# Patient Record
Sex: MALE | Race: WHITE | Employment: STUDENT | URBAN - METROPOLITAN AREA
[De-identification: names, ages, dates, MRNs, and addresses within clinical notes are randomized per-mention and may not be internally consistent; named-entity substitution may affect disease eponyms.]

---

## 2017-05-23 ENCOUNTER — HOSPITAL ENCOUNTER (OUTPATIENT)
Dept: PHYSICAL THERAPY | Age: 27
Discharge: HOME | End: 2017-05-23
Payer: OTHER GOVERNMENT

## 2017-05-23 PROCEDURE — 97162 PT EVAL MOD COMPLEX 30 MIN: CPT

## 2017-05-23 PROCEDURE — 97016 VASOPNEUMATIC DEVICE THERAPY: CPT

## 2017-05-23 PROCEDURE — 97140 MANUAL THERAPY 1/> REGIONS: CPT

## 2017-05-23 PROCEDURE — 97110 THERAPEUTIC EXERCISES: CPT

## 2017-05-23 NOTE — PROGRESS NOTES
PT DAILY TREATMENT NOTE     Patient Name: Willis Ramos  Date:2017  : 1990  [x]  Patient  Verified  Payor:  / Plan: [facility]  ACTIVE DUTY AND DEPENDENTS / Product Type: [employer] /    In time:3:05  Out time:4:00  Total Treatment Time (min): 55  Visit #: 1 of     Treatment Area: Right knee pain [M25.561]    SUBJECTIVE  Pain Level (0-10 scale): 1/10  Any medication changes, allergies to medications, adverse drug reactions, diagnosis change, or new procedure performed?: [x] No    [] Yes (see summary sheet for update)  Subjective functional status/changes:   [] No changes reported  The patient has a chief complaint of R knee stiffness/pain    OBJECTIVE  Modality rationale: decrease edema, decrease inflammation and decrease pain to improve the patients ability to improve ADL ease. Min Type Additional Details   10 [x]  Vasopneumatic Device Pressure:       [x] lo [] med [] hi   Temperature: [x] lo [] med [] hi   [] Skin assessment post-treatment:  []intact []redness- no adverse reaction    []redness  adverse reaction:     27 min X Eval                  -Re-Eval       10 min Therapeutic Exercise:  [x] See flow sheet :   Rationale: increase ROM and increase strength to improve the patients ability to improve ADL ease. 8 min Manual Therapy:  R knee PROM flexion/extension   Rationale: decrease pain, increase ROM and increase tissue extensibility to improve ADL ease. With   [] TE   [] TA   [] neuro   [] other: Patient Education: [x] Review HEP    [] Progressed/Changed HEP based on:   [] positioning   [] body mechanics   [] transfers   [] heat/ice application    [] other:      Other Objective/Functional Measures: See IE     Pain Level (0-10 scale) post treatment: 1/10    ASSESSMENT/Changes in Function: See POC.      Patient will continue to benefit from skilled PT services to modify and progress therapeutic interventions, address functional mobility deficits, address ROM deficits, address strength deficits, analyze and address soft tissue restrictions, analyze and cue movement patterns, analyze and modify body mechanics/ergonomics, assess and modify postural abnormalities and instruct in home and community integration to attain remaining goals. [x]  See Plan of Care  []  See progress note/recertification  []  See Discharge Summary         Progress towards goals / Updated goals:  Short Term Goals: To be accomplished in 2 weeks:  1. The patient will be independent and compliant with HEP to maximize therapeutic benefit. 2. The patient will attain 0 degrees knee extension to maximize ease of ambulation  Long Term Goals: To be accomplished in 4 weeks:  1. The patient will improve FOTO score to 66 to maximize quality of life. 2. The patient will improve knee flexion to 90 degrees per MD protocol for improved ease of transfers. 3. The patient will attain SLR without quad lag for improved stability during stance.   4. The patient will improve HS strength to 4+/5 MMT to maximize transfer ease.     PLAN  []  Upgrade activities as tolerated     [x]  Continue plan of care  []  Update interventions per flow sheet       []  Discharge due to:_  []  Other:_      Shanthi Rodriguez PT 5/23/2017  5:38 PM    Future Appointments  Date Time Provider Dipesh Freeman   5/25/2017 6:00 PM [address] [facility]   5/30/2017 3:30 PM [address] [facility]   6/2/2017 3:30 PM [address] [facility]   6/6/2017 5:30 PM [address] [facility]   6/8/2017 6:00 PM Darwin Flores [facility]   6/13/2017 4:00 PM JERRY WallacePT [facility]   6/16/2017 10:30 AM Shanthi Rodriguez PT [facility]

## 2017-05-23 NOTE — PROGRESS NOTES
[facility]  [address]  [place], [address]  [phone] fax    Plan of Care/ Statement of Necessity for Physical Therapy Services    Patient name: Lilly Landon Start of Care: 2017   Referral source: Ulises Regalado MD : 1990    Medical Diagnosis: Right knee pain [M25.561]   Onset Date:2017    Treatment Diagnosis: R ACL reconstruction/meniscus reconstruction   Prior Hospitalization: see medical history Provider#: [id number]   Medications: Verified on Patient summary List    Comorbidities: R ACL reconstruction/meniscus reconstruction   Prior Level of Function: Inability to run due to pain and popping. The Plan of Care and following information is based on the information from the initial evaluation. Assessment/ key information: The patient is a 32year old male s/p R ACL reconstruction with medial meniscus repair performed on 2017. He states that he was issued crutches and an IROM brace and has been taking it easy for a few days but has noticed a chief complaint of knee stiffness. The patient states that he injured it playing [employer] when he attempted to rotate on a fixed R LE and heard popping. Over the next year he attempted to return to running, but was unable due to popping and pain following this procedure. The patient has impairments consisting of pain, decreased ROM, decreased strength, decreased flexibility, decreased ambulation efficiency. He will benefit from skilled PT in order to address the above impairments.      Evaluation Complexity History MEDIUM  Complexity : 1-2 comorbidities / personal factors will impact the outcome/ POC ; Examination MEDIUM Complexity : 3 Standardized tests and measures addressing body structure, function, activity limitation and / or participation in recreation  ;Presentation LOW Complexity : Stable, uncomplicated  ;Clinical Decision Making MEDIUM Complexity : FOTO score of 26-74  Overall Complexity Rating: MEDIUM  Problem List: pain affecting function, decrease ROM, decrease strength, edema affecting function, impaired gait/ balance, decrease ADL/ functional abilitiies, decrease activity tolerance, decrease flexibility/ joint mobility and decrease transfer abilities   Treatment Plan may include any combination of the following: Therapeutic exercise, Therapeutic activities, Neuromuscular re-education, Physical agent/modality, Gait/balance training, Manual therapy, Patient education, Self Care training, Functional mobility training and Stair training  Patient / Family readiness to learn indicated by: asking questions, trying to perform skills and interest  Persons(s) to be included in education: patient (P)  Barriers to Learning/Limitations: None  Patient Goal (s): full recovery, full ROM  Patient Self Reported Health Status: good  Rehabilitation Potential: good    Short Term Goals: To be accomplished in 2 weeks:   1. The patient will be independent and compliant with HEP to maximize therapeutic benefit. 2. The patient will attain 0 degrees knee extension to maximize ease of ambulation  Long Term Goals: To be accomplished in 4 weeks:   1. The patient will improve FOTO score to 66 to maximize quality of life. 2. The patient will improve knee flexion to 90 degrees per MD protocol for improved ease of transfers. 3. The patient will attain SLR without quad lag for improved stability during stance. 4. The patient will improve HS strength to 4+/5 MMT to maximize transfer ease. Frequency / Duration: Patient to be seen 2-3 times per week for 4 weeks.     Patient/ Caregiver education and instruction: Diagnosis, prognosis, self care, activity modification, brace/ splint application and exercises   [x]  Plan of care has been reviewed with TAMIA Nolan, PT 5/23/2017 5:28 PM    ________________________________________________________________________    I certify that the above Therapy Services are being furnished while the patient is under my care. I agree with the treatment plan and certify that this therapy is necessary.     [de-identified] Signature:____________________  Date:____________Time: _________    Please sign and return to [facility] [address] [place] [address], [facility] [address]  [phone] fax

## 2017-05-25 ENCOUNTER — HOSPITAL ENCOUNTER (OUTPATIENT)
Dept: PHYSICAL THERAPY | Age: 27
Discharge: HOME | End: 2017-05-25
Payer: OTHER GOVERNMENT

## 2017-05-25 PROCEDURE — 97110 THERAPEUTIC EXERCISES: CPT

## 2017-05-25 PROCEDURE — 97112 NEUROMUSCULAR REEDUCATION: CPT

## 2017-05-25 PROCEDURE — 97016 VASOPNEUMATIC DEVICE THERAPY: CPT

## 2017-05-25 NOTE — PROGRESS NOTES
PT DAILY TREATMENT NOTE     Patient Name: Venkatesh Hughes  Date:2017  : 1990  [x]  Patient  Verified  Payor: [facility] / Plan: [employer] [facility] [address] AND DEPENDENTS / Product Type: [employer] /    In time:555  Out time:640  Total Treatment Time (min): 45  Visit #: 2 of     Treatment Area: Right knee pain [M25.561]    SUBJECTIVE  Pain Level (0-10 scale): 0  Any medication changes, allergies to medications, adverse drug reactions, diagnosis change, or new procedure performed?: [x] No    [] Yes (see summary sheet for update)  Subjective functional status/changes:   [] No changes reported  I'm feeling pretty good    OBJECTIVE  27 min Therapeutic Exercise:  [x] See flow sheet :   Rationale: increase ROM and increase strength to improve the patients ability to increase ease of ADLs  8 min Neuromuscular Re-education:  [x]  See flow sheet :   Rationale: increase strength and increase proprioception  to improve the patients ability to increase quad contraction   Modality rationale: decrease edema, decrease inflammation and decrease pain to improve the patients ability to improve quad contraction   Min Type Additional Details    [] Estim:  []Unatt       []IFC  []Premod                        []Other:  []w/ice   []w/heat  Position:  Location:    [] Estim: []Att    []TENS instruct  []NMES                    []Other:  []w/US   []w/ice   []w/heat  Position:  Location:    []  Traction: [] Cervical       []Lumbar                       [] Prone          []Supine                       []Intermittent   []Continuous Lbs:  [] before manual  [] after manual    []  Ultrasound: []Continuous   [] Pulsed                           []1MHz   []3MHz Location:  W/cm2:    []  Iontophoresis with dexamethasone         Location: [] Take home patch   [] In clinic    []  Ice     []  heat  []  Ice massage  []  Laser   []  Anodyne Position:  Location:    []  Laser with stim  []  Other: Position:  Location:   10 [x]  Vasopneumatic Device Pressure:       [x] lo [] med [] hi   Temperature: [x] lo [] med [] hi   [x] Skin assessment post-treatment:  []intact []redness- no adverse reaction    []redness  adverse reaction:             With   [] TE   [] TA   [] neuro   [] other: Patient Education: [x] Review HEP    [] Progressed/Changed HEP based on:   [] positioning   [] body mechanics   [] transfers   [] heat/ice application    [] other:      Other Objective/Functional Measures: ROM: 3-65     Pain Level (0-10 scale) post treatment: 0    ASSESSMENT/Changes in Function: Pain tolerance is excellent. Pt can ambulate safely household distances with 1 AC and locked brace. Extension lag noted in locked brace with SLRs    Patient will continue to benefit from skilled PT services to modify and progress therapeutic interventions, address functional mobility deficits, address ROM deficits, address strength deficits, analyze and address soft tissue restrictions, analyze and cue movement patterns, analyze and modify body mechanics/ergonomics and assess and modify postural abnormalities to attain remaining goals. []  See Plan of Care  []  See progress note/recertification  []  See Discharge Summary         Progress towards goals / Updated goals:  Short Term Goals: To be accomplished in 2 weeks:  1. The patient will be independent and compliant with HEP to maximize therapeutic benefit. Met - compliant with HEP 5/25/2017  2. The patient will attain 0 degrees knee extension to maximize ease of ambulation  Long Term Goals: To be accomplished in 4 weeks:  1. The patient will improve FOTO score to 66 to maximize quality of life. 2. The patient will improve knee flexion to 90 degrees per MD protocol for improved ease of transfers. 3. The patient will attain SLR without quad lag for improved stability during stance. 4. The patient will improve HS strength to 4+/5 MMT to maximize transfer ease.     PLAN  []  Upgrade activities as tolerated     [x]  Continue plan of care  []  Update interventions per flow sheet       []  Discharge due to:_  []  Other:_      Israel Grajeda, PT 5/25/2017  7:15 PM    Future Appointments  Date Time Provider Dipesh Freeman   5/30/2017 3:30 PM [address] [facility]   6/2/2017 3:30 PM [address] [facility]   6/6/2017 5:30 PM [address] [facility]   6/8/2017 6:00 PM Marck Bond [facility]   6/13/2017 4:00 PM [facility]   6/16/2017 10:30 AM Dottie Suero PT [facility]

## 2017-05-30 ENCOUNTER — HOSPITAL ENCOUNTER (OUTPATIENT)
Dept: PHYSICAL THERAPY | Age: 27
Discharge: HOME | End: 2017-05-30
Payer: OTHER GOVERNMENT

## 2017-05-30 PROCEDURE — 97140 MANUAL THERAPY 1/> REGIONS: CPT

## 2017-05-30 PROCEDURE — 97014 ELECTRIC STIMULATION THERAPY: CPT

## 2017-05-30 PROCEDURE — 97016 VASOPNEUMATIC DEVICE THERAPY: CPT

## 2017-05-30 NOTE — PROGRESS NOTES
PT DAILY TREATMENT NOTE     Patient Name: Rose Arroyo  Date:2017  : 1990  [x]  Patient  Verified  Payor:  / Plan: [facility]  ACTIVE DUTY AND DEPENDENTS / Product Type:  /    In time:3:30pm  Out time:4:30pm  Total Treatment Time (min): 60  1:1 time: 12  Visit #: 3 of     Treatment Area: Right knee pain [M25.561]    SUBJECTIVE  Pain Level (0-10 scale): 1  Any medication changes, allergies to medications, adverse drug reactions, diagnosis change, or new procedure performed?: [x] No    [] Yes (see summary sheet for update)  Subjective functional status/changes:   [x] No changes reported    OBJECTIVE    24 min Therapeutic Exercise:  [x] See flow sheet :   Rationale: increase ROM and increase strength to improve the patients ability to improve ADL ease and reduce risk of contracture in preparation for return to normal ambulation. 8 min Neuromuscular Re-education:  [x]  See flow sheet :   Rationale: increase strength and improve coordination  to improve the patients ability to improve quad contraction to improve knee stability during daily tasks.         8 min Manual Therapy:  R knee PROM limiting flexion to 90, grade 1 patellar mobilizations   Rationale: decrease pain, increase ROM and increase tissue extensibility to improve ease of knee mobility in preparation for ambulation    Modality rationale: decrease edema, decrease inflammation and decrease pain to improve the patients ability to improve quad contraction   Min Type Additional Details   10 [x] Estim:  [x]Unatt       []IFC  []Premod                        [x]Other: [place] 10/10 cycle []w/ice   []w/heat  Position: long sit  Location: R quadriceps femoris    [] Estim: []Att    []TENS instruct  []NMES                    []Other:  []w/US   []w/ice   []w/heat  Position:  Location:    []  Traction: [] Cervical       []Lumbar                       [] Prone          []Supine                       []Intermittent   []Continuous Lbs:  [] before manual  [] after manual    []  Ultrasound: []Continuous   [] Pulsed                           []1MHz   []3MHz Location:  W/cm2:    []  Iontophoresis with dexamethasone         Location: [] Take home patch   [] In clinic    []  Ice     []  heat  []  Ice massage  []  Laser   []  Anodyne Position:  Location:    []  Laser with stim  []  Other: Position:  Location:   10 [x]  Vasopneumatic Device Pressure:       [x] lo [] med [] hi   Temperature: [x] lo [] med [] hi   [] Skin assessment post-treatment:  []intact []redness- no adverse reaction    []redness  adverse reaction:        With   [] TE   [] TA   [] neuro   [] other: Patient Education: [x] Review HEP    [] Progressed/Changed HEP based on:   [] positioning   [] body mechanics   [] transfers   [] heat/ice application    [] other:      Other Objective/Functional Measures:     Knee AROM: progressing towards 90 degrees without significant limitations, mild end range TKE deficits grossly assessed     Pain Level (0-10 scale) post treatment: 1    ASSESSMENT/Changes in Function: Pt is progressing well demonstrating fair quad set, but still limited compared to contralateral side. Knee PROM improving well demonstrating near 0-90 with minimal end range flex and ext limitations in that range. Continue per POC. Patient will continue to benefit from skilled PT services to modify and progress therapeutic interventions, address functional mobility deficits, address ROM deficits, address strength deficits, analyze and address soft tissue restrictions, analyze and cue movement patterns, analyze and modify body mechanics/ergonomics and assess and modify postural abnormalities to attain remaining goals. []  See Plan of Care  []  See progress note/recertification  []  See Discharge Summary         Progress towards goals / Updated goals:  Short Term Goals: To be accomplished in 2 weeks:  1.  The patient will be independent and compliant with HEP to maximize therapeutic benefit. Met - compliant with HEP 5/25/2017  2. The patient will attain 0 degrees knee extension to maximize ease of ambulation  Long Term Goals: To be accomplished in 4 weeks:  1. The patient will improve FOTO score to 66 to maximize quality of life. 2. The patient will improve knee flexion to 90 degrees per MD protocol for improved ease of transfers. 3. The patient will attain SLR without quad lag for improved stability during stance. 4. The patient will improve HS strength to 4+/5 MMT to maximize transfer ease.     PLAN  [x]  Upgrade activities as tolerated     [x]  Continue plan of care  []  Update interventions per flow sheet       []  Discharge due to:_  []  Other:_      Grecia Jurado, PT, DPT, ATC, CSCS 5/30/2017  3:37 PM

## 2017-06-02 ENCOUNTER — HOSPITAL ENCOUNTER (OUTPATIENT)
Dept: PHYSICAL THERAPY | Age: 27
Discharge: HOME | End: 2017-06-02
Payer: OTHER GOVERNMENT

## 2017-06-02 PROCEDURE — 97016 VASOPNEUMATIC DEVICE THERAPY: CPT

## 2017-06-02 PROCEDURE — 97140 MANUAL THERAPY 1/> REGIONS: CPT

## 2017-06-02 PROCEDURE — 97112 NEUROMUSCULAR REEDUCATION: CPT

## 2017-06-02 PROCEDURE — 97014 ELECTRIC STIMULATION THERAPY: CPT

## 2017-06-02 NOTE — PROGRESS NOTES
PT DAILY TREATMENT NOTE     Patient Name: Herman Mattson  Date:2017  : 1990  [x]  Patient  Verified  Payor:  / Plan: [facility] DEPENDENTS / Product Type:  /    In time:3:30pm  Out time:4:30pm  Total Treatment Time (min): 60  1:1 time: 20  Visit #: 4 of     Treatment Area: Right knee pain [M25.561]    SUBJECTIVE  Pain Level (0-10 scale): 2  Any medication changes, allergies to medications, adverse drug reactions, diagnosis change, or new procedure performed?: [x] No    [] Yes (see summary sheet for update)  Subjective functional status/changes:   [] No changes reported  Pt reports increased soreness after extensive ambulation with crutches today. Otherwise no complaints. OBJECTIVE    24 min Therapeutic Exercise:  [x] See flow sheet :   Rationale: increase ROM and increase strength to improve the patients ability to improve ADL ease and reduce risk of contracture in preparation for return to normal ambulation. 8 min Neuromuscular Re-education:  [x]  See flow sheet :   Rationale: increase strength and improve coordination  to improve the patients ability to improve quad contraction to improve knee stability. 8 min Manual Therapy:  Grade 1-2 patellar mobilizations all planes, R knee PROM 0-90 degrees   Rationale: decrease pain, increase ROM and increase tissue extensibility to improve ease of ADLs.     Modality rationale: decrease edema, decrease inflammation, decrease pain and increase muscle contraction/control to improve the patients ability to improve quad motor control and reduce inhibition secondary to edema to improve stability with ambulation   Min Type Additional Details   10 [x] Estim:  [x]Unatt       []IFC  []Premod                        [x]Other: [place] 10/10 cycle []w/ice   []w/heat  Position: long sit  Location: R quadriceps femoris    [] Estim: []Att    []TENS instruct  []NMES                    []Other:  []w/US   []w/ice []w/heat  Position:  Location:    []  Traction: [] Cervical       []Lumbar                       [] Prone          []Supine                       []Intermittent   []Continuous Lbs:  [] before manual  [] after manual    []  Ultrasound: []Continuous   [] Pulsed                           []1MHz   []3MHz Location:  W/cm2:    []  Iontophoresis with dexamethasone         Location: [] Take home patch   [] In clinic    []  Ice     []  heat  []  Ice massage  []  Laser   []  Anodyne Position:  Location:    []  Laser with stim  []  Other: Position:  Location:   10 [x]  Vasopneumatic Device Pressure:       [] lo [x] med [] hi   Temperature: [x] lo [] med [] hi   [] Skin assessment post-treatment:  []intact []redness- no adverse reaction    []redness  adverse reaction:           With   [] TE   [] TA   [] neuro   [] other: Patient Education: [x] Review HEP    [] Progressed/Changed HEP based on:   [] positioning   [] body mechanics   [] transfers   [] heat/ice application    [] other:      Other Objective/Functional Measures: mild TKE deficit, grossly 90 degrees knee flexion     Pain Level (0-10 scale) post treatment: 1    ASSESSMENT/Changes in Function: Pt progressing well with knee ROM within protocol precautions with minimal TKE deficits, likely inpart d/t residual edema. Demonstrates improving quad motor control with ability to perform SLR without quad lag, but quickly fatigue with repetitions with quad lag noted after ~6 reps. Continue per POC. Patient will continue to benefit from skilled PT services to modify and progress therapeutic interventions, address functional mobility deficits, address ROM deficits, address strength deficits, analyze and address soft tissue restrictions, analyze and cue movement patterns, analyze and modify body mechanics/ergonomics and assess and modify postural abnormalities to attain remaining goals.      []  See Plan of Care  []  See progress note/recertification  []  See Discharge Summary Progress towards goals / Updated goals:  Short Term Goals: To be accomplished in 2 weeks:  1. The patient will be independent and compliant with HEP to maximize therapeutic benefit. Met - compliant with HEP 5/25/2017  2. The patient will attain 0 degrees knee extension to maximize ease of ambulation  Long Term Goals: To be accomplished in 4 weeks:  1. The patient will improve FOTO score to 66 to maximize quality of life. 2. The patient will improve knee flexion to 90 degrees per MD protocol for improved ease of transfers. 3. The patient will attain SLR without quad lag for improved stability during stance. 4. The patient will improve HS strength to 4+/5 MMT to maximize transfer ease.     PLAN  [x]  Upgrade activities as tolerated     [x]  Continue plan of care  []  Update interventions per flow sheet       []  Discharge due to:_  []  Other:_      Wilton Chisholm PT, DPT, ATC, CSCS 6/2/2017  4:05 PM

## 2017-06-06 ENCOUNTER — HOSPITAL ENCOUNTER (OUTPATIENT)
Dept: PHYSICAL THERAPY | Age: 27
Discharge: HOME | End: 2017-06-06
Payer: OTHER GOVERNMENT

## 2017-06-06 PROCEDURE — 97016 VASOPNEUMATIC DEVICE THERAPY: CPT

## 2017-06-06 PROCEDURE — 97112 NEUROMUSCULAR REEDUCATION: CPT

## 2017-06-06 PROCEDURE — 97110 THERAPEUTIC EXERCISES: CPT

## 2017-06-06 PROCEDURE — 97140 MANUAL THERAPY 1/> REGIONS: CPT

## 2017-06-06 PROCEDURE — 97014 ELECTRIC STIMULATION THERAPY: CPT

## 2017-06-06 NOTE — PROGRESS NOTES
PT DAILY TREATMENT NOTE     Patient Name: Mihai Vega  Date:2017  : 1990  [x]  Patient  Verified  Payor:  / Plan: [facility]  ACTIVE DUTY AND DEPENDENTS / Product Type:  /    In time:5:30pm  Out time:6:20pm  Total Treatment Time (min): 50  Visit #: 5 of     Treatment Area: Right knee pain [M25.561]    SUBJECTIVE  Pain Level (0-10 scale): 0  Any medication changes, allergies to medications, adverse drug reactions, diagnosis change, or new procedure performed?: [x] No    [] Yes (see summary sheet for update)  Subjective functional status/changes:   [] No changes reported  Pt reports mild knee soreness upon arrival. Reports he is no longer using his crutches for ambulation, but is keeping his brace locked. OBJECTIVE    23 min Therapeutic Exercise:  [x] See flow sheet :   Rationale: increase ROM and increase strength to improve the patients ability to improve ADL ease and reduce risk of contracture in preparation for return to normal ambulation. 8 min Neuromuscular Re-education:  [x]  See flow sheet :   Rationale: increase strength and improve coordination  to improve the patients ability to improve quad contraction to improve knee stability. 8 min Manual Therapy:  Grade 1-3 patellar mobilizations all planes, R knee PROM 0-90 degrees   Rationale: decrease pain, increase ROM and increase tissue extensibility to improve ADL ease. Modality rationale: decrease edema, decrease inflammation, decrease pain and increase muscle contraction/control to improve the patients ability to improve ease of ambulation.    Min Type Additional Details   10+1 setup [x] Estim:  [x]Unatt       []IFC  []Premod                        [x]Other: [place] 10/10 cycle  []w/ice   []w/heat  Position: supine  Location: L quadriceps femoris    [] Estim: []Att    []TENS instruct  []NMES                    []Other:  []w/US   []w/ice   []w/heat  Position:  Location:    []  Traction: [] Cervical []Lumbar                       [] Prone          []Supine                       []Intermittent   []Continuous Lbs:  [] before manual  [] after manual    []  Ultrasound: []Continuous   [] Pulsed                           []1MHz   []3MHz Location:  W/cm2:    []  Iontophoresis with dexamethasone         Location: [] Take home patch   [] In clinic    []  Ice     []  heat  []  Ice massage  []  Laser   []  Anodyne Position:  Location:    []  Laser with stim  []  Other: Position:  Location:    []  Vasopneumatic Device Pressure:       [] lo [] med [] hi   Temperature: [] lo [] med [] hi   [] Skin assessment post-treatment:  []intact []redness- no adverse reaction    []redness  adverse reaction:           With   [] TE   [] TA   [] neuro   [] other: Patient Education: [x] Review HEP    [] Progressed/Changed HEP based on:   [] positioning   [] body mechanics   [] transfers   [] heat/ice application    [] other:      Other Objective/Functional Measures:    Mild end range limitations in TKE    Mild R knee edema     Demonstrates minimal initial quad lag with SLR, but improves to near absent after initial cues    Pain Level (0-10 scale) post treatment: 0    ASSESSMENT/Changes in Function: Pt progressing well with quad re-education demonstrating good quad set at this time. Demonstrates grossly 90 degrees flexion AROM, with minimal TKE deficits likely related to mild knee edema. Educated pt regarding activity modification, icing, and elevation to address knee edema as needed, particularly as pt is now ambulating FWB void of ADs (with brace locked). Continue per POC and being progressing into WB exercise with brace locked as tolerated until cleared to begin closed chain knee strengthening.     Patient will continue to benefit from skilled PT services to modify and progress therapeutic interventions, address functional mobility deficits, address ROM deficits, address strength deficits, analyze and address soft tissue restrictions, analyze and cue movement patterns, analyze and modify body mechanics/ergonomics, address imbalance/dizziness and instruct in home and community integration to attain remaining goals. []  See Plan of Care  []  See progress note/recertification  []  See Discharge Summary         Progress towards goals / Updated goals:  Short Term Goals: To be accomplished in 2 weeks:  1. The patient will be independent and compliant with HEP to maximize therapeutic benefit. Met - compliant with HEP 5/25/2017  2. The patient will attain 0 degrees knee extension to maximize ease of ambulation Near met grossly 6/6/2017  Long Term Goals: To be accomplished in 4 weeks:  1. The patient will improve FOTO score to 66 to maximize quality of life. 2. The patient will improve knee flexion to 90 degrees per MD protocol for improved ease of transfers. Grossly met 6/6/2017  3. The patient will attain SLR without quad lag for improved stability during stance. 4. The patient will improve HS strength to 4+/5 MMT to maximize transfer ease.     PLAN  []  Upgrade activities as tolerated     [x]  Continue plan of care  []  Update interventions per flow sheet       []  Discharge due to:_  []  Other:_      Christiano Fischer, PT, DPT, ATC, CSCS 6/6/2017  5:58 PM

## 2017-06-08 ENCOUNTER — HOSPITAL ENCOUNTER (OUTPATIENT)
Dept: PHYSICAL THERAPY | Age: 27
Discharge: HOME | End: 2017-06-08
Payer: OTHER GOVERNMENT

## 2017-06-08 PROCEDURE — 97110 THERAPEUTIC EXERCISES: CPT

## 2017-06-08 PROCEDURE — 97140 MANUAL THERAPY 1/> REGIONS: CPT

## 2017-06-08 PROCEDURE — 97112 NEUROMUSCULAR REEDUCATION: CPT

## 2017-06-08 NOTE — PROGRESS NOTES
PT DAILY TREATMENT NOTE     Patient Name: Rose Arroyo  Date:2017  : 1990  [x]  Patient  Verified  Payor:  / Plan: [facility]  ACTIVE DUTY AND DEPENDENTS / Product Type: [place] /    In time:6:00pm  Out time:6:53pm  Total Treatment Time (min): 53  1:1 time: 28  Visit #: 6 of     Treatment Area: Right knee pain [M25.561]    SUBJECTIVE  Pain Level (0-10 scale): 0  Any medication changes, allergies to medications, adverse drug reactions, diagnosis change, or new procedure performed?: [x] No    [] Yes (see summary sheet for update)  Subjective functional status/changes:   [x] No changes reported    OBJECTIVE    35 min Therapeutic Exercise:  [x] See flow sheet :   Rationale: increase ROM, increase strength and improve coordination to improve the patients ability to improve ADL ease. 8 min Manual Therapy:  Grade 1-3 patellar mobilizations all planes, R knee PROM 0-90 degrees   Rationale: decrease pain, increase ROM and increase tissue extensibility to improve ADL ease. 10 min Neuromuscular Re-education:  [x]  See flow sheet :   Rationale: increase strength and improve coordination  to improve the patients ability to improve quad motor control in preparation for ambulation. With   [] TE   [] TA   [] neuro   [] other: Patient Education: [x] Review HEP    [] Progressed/Changed HEP based on:   [] positioning   [] body mechanics   [] transfers   [] heat/ice application    [] other:      Other Objective/Functional Measures:      TC quad NMES    PD icing    Performed supine knee ext hang with CP    Pain Level (0-10 scale) post treatment: 1    ASSESSMENT/Changes in Function: Pt demonstrates some initial stiffness with knee flexion, but improved with repetition to 90 degrees without difficulty. Continues to demonstrate mild R knee edema. Educated pt regarding importance of performing icing and elevation to control swelling.  Initiated stationary bike and some quadriceps isometrics void of pain provocation. Continue per Protocols. PD CP post, instructed to ice 15-20 minutes at home. Pt agreed. Patient will continue to benefit from skilled PT services to modify and progress therapeutic interventions, address functional mobility deficits, address ROM deficits, address strength deficits, analyze and address soft tissue restrictions, analyze and cue movement patterns, analyze and modify body mechanics/ergonomics, address imbalance/dizziness and instruct in home and community integration to attain remaining goals. []  See Plan of Care  []  See progress note/recertification  []  See Discharge Summary         Progress towards goals / Updated goals:  Short Term Goals: To be accomplished in 2 weeks:  1. The patient will be independent and compliant with HEP to maximize therapeutic benefit. Met - compliant with HEP 5/25/2017  2. The patient will attain 0 degrees knee extension to maximize ease of ambulation Near met grossly 6/6/2017  Long Term Goals: To be accomplished in 4 weeks:  1. The patient will improve FOTO score to 66 to maximize quality of life. 2. The patient will improve knee flexion to 90 degrees per MD protocol for improved ease of transfers. Grossly met 6/6/2017  3. The patient will attain SLR without quad lag for improved stability during stance.   4. The patient will improve HS strength to 4+/5 MMT to maximize transfer ease    PLAN  []  Upgrade activities as tolerated     [x]  Continue plan of care  []  Update interventions per flow sheet       []  Discharge due to:_  []  Other:_      Walter Guzmán, PT, DPT, ATC, CSCS 6/8/2017  7:01 PM

## 2017-06-13 ENCOUNTER — HOSPITAL ENCOUNTER (OUTPATIENT)
Dept: PHYSICAL THERAPY | Age: 27
Discharge: HOME | End: 2017-06-13
Payer: OTHER GOVERNMENT

## 2017-06-13 PROCEDURE — 97110 THERAPEUTIC EXERCISES: CPT

## 2017-06-13 PROCEDURE — 97140 MANUAL THERAPY 1/> REGIONS: CPT

## 2017-06-13 NOTE — PROGRESS NOTES
PT DAILY TREATMENT NOTE     Patient Name: Yamilex Hussein  Date:2017  : 1990  [x]  Patient  Verified  Payor:  / Plan: [facility]  ACTIVE DUTY AND DEPENDENTS / Product Type: [place] /    In time:4:00  Out time:4:42  Total Treatment Time (min): 42  Visit #: 7 of     Treatment Area: Right knee pain [M25.561]     SUBJECTIVE  Pain Level (0-10 scale): 0/10  Any medication changes, allergies to medications, adverse drug reactions, diagnosis change, or new procedure performed?: [x] No    [] Yes (see summary sheet for update)  Subjective functional status/changes:   [] No changes reported  The patient states that he has no new complaints regarding his knee. He does note that it feels less tight. OBJECTIVE  32 min Therapeutic Exercise:  [x] See flow sheet :   Rationale: increase ROM and increase strength to improve the patients ability to improve ADL ease. 10 min Manual Therapy:  PROM - flexion/extension, medial patellar mobs   Rationale: decrease pain, increase ROM and increase tissue extensibility to improve ADL ease. With   [] TE   [] TA   [] neuro   [] other: Patient Education: [x] Review HEP    [] Progressed/Changed HEP based on:   [] positioning   [] body mechanics   [] transfers   [] heat/ice application    [] other:      Other Objective/Functional Measures:    Knee flexion 2 - 95 degrees  Able to perform SLR void of lag      Pain Level (0-10 scale) post treatment: 1/10    ASSESSMENT/Changes in Function: Good progress with regards to ROM and quad strengthening. Continue hip stability exercises with standing as able with brace locked for protection of meniscus repair. Pt to follow-up with MD next week.      Patient will continue to benefit from skilled PT services to modify and progress therapeutic interventions, address functional mobility deficits, address ROM deficits, address strength deficits, analyze and address soft tissue restrictions, analyze and cue movement patterns, analyze and modify body mechanics/ergonomics, assess and modify postural abnormalities and instruct in home and community integration to attain remaining goals. []  See Plan of Care  []  See progress note/recertification   []  See Discharge Summary         Progress towards goals / Updated goals:  Short Term Goals: To be accomplished in 2 weeks:  1. The patient will be independent and compliant with HEP to maximize therapeutic benefit. Met - compliant with HEP 5/25/2017  2. The patient will attain 0 degrees knee extension to maximize ease of ambulation Near met grossly 6/6/2017  Long Term Goals: To be accomplished in 4 weeks:   1. The patient will improve FOTO score to 66 to maximize quality of life. 2. The patient will improve knee flexion to 90 degrees per MD protocol for improved ease of transfers. Grossly met 6/6/2017; Met 90 degrees 6/13/2017. 3. The patient will attain SLR without quad lag for improved stability during stance. Met - 6/13/2017. 4. The patient will improve HS strength to 4+/5 MMT to maximize transfer ease.     PLAN  []  Upgrade activities as tolerated     [x]  Continue plan of care  []  Update interventions per flow sheet       []  Discharge due to:_  []  Other:_      Gloria Franklin PT 6/13/2017  4:12 PM    Future Appointments  Date Time Provider Dipesh Freeman   6/16/2017 10:30 AM Gloria Franklin PT [id number]

## 2017-06-16 ENCOUNTER — HOSPITAL ENCOUNTER (OUTPATIENT)
Dept: PHYSICAL THERAPY | Age: 27
Discharge: HOME | End: 2017-06-16
Payer: OTHER GOVERNMENT

## 2017-06-16 PROCEDURE — 97110 THERAPEUTIC EXERCISES: CPT

## 2017-06-16 PROCEDURE — 97140 MANUAL THERAPY 1/> REGIONS: CPT

## 2017-06-16 PROCEDURE — 97016 VASOPNEUMATIC DEVICE THERAPY: CPT

## 2017-06-16 NOTE — PROGRESS NOTES
PT DAILY TREATMENT NOTE     Patient Name: José Luis Mckeon  Date:2017  : 1990  [x]  Patient  Verified  Payor:  / Plan: [facility]  ACTIVE DUTY AND DEPENDENTS / Product Type:  /    In time:10:32  Out time:11:28    Total Treatment Time (min): 64   Visit #: 8 of     Treatment Area: Right knee pain [M25.561]    SUBJECTIVE  Pain Level (0-10 scale): 0/10  Any medication changes, allergies to medications, adverse drug reactions, diagnosis change, or new procedure performed?: [x] No    [] Yes (see summary sheet for update)  Subjective functional status/changes:   [] No changes reported  Pt states he can tell his knee is moving better. OBJECTIVE  Modality rationale: decrease edema, decrease inflammation and decrease pain to improve the patients ability to improve ADl ease. Min Type Additional Details   10 [x]  Vasopneumatic Device Pressure:       [x] lo [] med [] hi   Temperature: [x] lo [] med [] hi   [] Skin assessment post-treatment:  []intact []redness- no adverse reaction    []redness  adverse reaction:     36 min Therapeutic Exercise:  [] See flow sheet :   Rationale: increase ROM and increase strength to improve the patients ability to improve ADL ease. min Neuromuscular Re-education:  []  See flow sheet :   Rationale:   to improve the patients ability to     10 min Manual Therapy:  PROM - R knee flexion/extension, patellar mobs, scar massage distal scar region. Rationale: decrease pain, increase ROM and increase tissue extensibility to improve ADL ease. With   [] TE   [] TA   [] neuro   [] other: Patient Education: [x] Review HEP    [] Progressed/Changed HEP based on:   [] positioning   [] body mechanics   [] transfers   [] heat/ice application    [] other:      Other Objective/Functional Measures:    FOTO: 48  Progressed knee flexion to 100 degrees today     Pain Level (0-10 scale) post treatment: 0/10    ASSESSMENT/Changes in Function: The patient has progressed with FOTO score and knee flexion. Pt to follow-up with MD next week with anticipated progression the following week with closed chain exercises. Patient will continue to benefit from skilled PT services to modify and progress therapeutic interventions, address functional mobility deficits, address ROM deficits, address strength deficits, analyze and address soft tissue restrictions, analyze and cue movement patterns, analyze and modify body mechanics/ergonomics, assess and modify postural abnormalities and instruct in home and community integration to attain remaining goals. []  See Plan of Care  []  See progress note/recertification  []  See Discharge Summary         Progress towards goals / Updated goals:  Short Term Goals: To be accomplished in 2 weeks:  1. The patient will be independent and compliant with HEP to maximize therapeutic benefit. Met - compliant with HEP 5/25/2017  2. The patient will attain 0 degrees knee extension to maximize ease of ambulation Near met grossly 6/6/2017  Long Term Goals: To be accomplished in 4 weeks:   1. The patient will improve FOTO score to 66 to maximize quality of life. Progressed 48 6/16/2017.  2. The patient will improve knee flexion to 90 degrees per MD protocol for improved ease of transfers. Grossly met 6/6/2017; Met 90 degrees 6/13/2017. 3. The patient will attain SLR without quad lag for improved stability during stance. Met - 6/13/2017.    4. The patient will improve HS strength to 4+/5 MMT to maximize transfer ease.     PLAN  []  Upgrade activities as tolerated     [x]  Continue plan of care  []  Update interventions per flow sheet       []  Discharge due to:_  []  Other:_      Monika Chen PT 6/16/2017  11:27 AM    Future Appointments  Date Time Provider Dipesh Freeman   6/20/2017 5:00 PM [address] [place] [address]   6/27/2017 6:00 PM [address] [place] [address]   6/29/2017 6:00 [facility]   7/6/2017 6:00 PM Kristian Adjutant, PT [facility]

## 2017-06-20 ENCOUNTER — HOSPITAL ENCOUNTER (OUTPATIENT)
Dept: PHYSICAL THERAPY | Age: 27
Discharge: HOME | End: 2017-06-20
Payer: OTHER GOVERNMENT

## 2017-06-20 PROCEDURE — 97110 THERAPEUTIC EXERCISES: CPT

## 2017-06-20 PROCEDURE — 97140 MANUAL THERAPY 1/> REGIONS: CPT

## 2017-06-20 PROCEDURE — 97016 VASOPNEUMATIC DEVICE THERAPY: CPT

## 2017-06-20 NOTE — PROGRESS NOTES
PT DAILY TREATMENT NOTE     Patient Name: Lilly Landon  Date:2017  : 1990  [x]  Patient  Verified  Payor:  / Plan: [facility]  ACTIVE DUTY AND DEPENDENTS / Product Type: [place] /    In time:5:00pm  Out time:5:53pm  Total Treatment Time (min): 48  Visit #: 9 of     Treatment Area: Right knee pain [M25.561]    SUBJECTIVE  Pain Level (0-10 scale): 0  Any medication changes, allergies to medications, adverse drug reactions, diagnosis change, or new procedure performed?: [x] No    [] Yes (see summary sheet for update)  Subjective functional status/changes:   [] No changes reported  Pt reports he f/u with his MD on 2017. Advised pt to discuss progressing to closed chain activity and to bring in a script when he f/u. OBJECTIVE  35 min Therapeutic Exercise:  [x] See flow sheet :   Rationale: increase ROM and increase strength to improve the patients ability to improve ADL ease. 8 min Manual Therapy:  PROM R knee flexion/extension, patellar mobs, scar massage   Rationale: decrease pain, increase ROM and increase tissue extensibility to improve ADL ease. Modality rationale: decrease edema, decrease inflammation and decrease pain to improve the patients ability to improve ADL ease.    Min Type Additional Details    [] Estim:  []Unatt       []IFC  []Premod                        []Other:  []w/ice   []w/heat  Position:  Location:    [] Estim: []Att    []TENS instruct  []NMES                    []Other:  []w/US   []w/ice   []w/heat  Position:  Location:    []  Traction: [] Cervical       []Lumbar                       [] Prone          []Supine                       []Intermittent   []Continuous Lbs:  [] before manual  [] after manual    []  Ultrasound: []Continuous   [] Pulsed                           []1MHz   []3MHz Location:  W/cm2:    []  Iontophoresis with dexamethasone         Location: [] Take home patch   [] In clinic    []  Ice     []  heat  []  Ice massage  [] Laser   []  Anodyne Position:  Location:    []  Laser with stim  []  Other: Position:  Location:   10 [x]  Vasopneumatic Device Pressure:       [x] lo [] med [] hi   Temperature: [x] lo [] med [] hi   [] Skin assessment post-treatment:  []intact []redness- no adverse reaction    []redness  adverse reaction:       With   [x] TE   [] TA   [] neuro   [] other: Patient Education: [x] Review HEP    [] Progressed/Changed HEP based on:   [] positioning   [] body mechanics   [] transfers   [] heat/ice application    [x] other: per handout, instructed pt to perform 1-2 sets as tolerated at a time, 3x/day. Other Objective/Functional Measures:    Knee ext AROM/PROM: 2/1 degree    HS MMT: 4/5    Knee Flexion to 90 degrees with ease    Minimal R knee effusion, no greater than previously noted     Pain Level (0-10 scale) post treatment: 0    ASSESSMENT/Changes in Function: Pt has progressed well with knee ROM with minimal TKE deficits of 1-2 degrees and achieves 90 degrees knee flexion with ease. Demonstrates gradually improving quad and HS strength with good quad set and no quad lag with SLR. At this time, pt will decrease PT frequency to no more than 1x/week pending MD f/u and clearance to begin progression into closed chain strengthening activity, at which point pt will return to 2x/week treatment frequency. Patient will continue to benefit from skilled PT services to modify and progress therapeutic interventions, address functional mobility deficits, address ROM deficits, address strength deficits, analyze and address soft tissue restrictions, analyze and cue movement patterns, analyze and modify body mechanics/ergonomics, address imbalance/dizziness and instruct in home and community integration to attain remaining goals. []  See Plan of Care  []  See progress note/recertification  []  See Discharge Summary         Progress towards goals / Updated goals:  Short Term Goals: To be accomplished in 2 weeks:  1.  The patient will be independent and compliant with HEP to maximize therapeutic benefit. Met - compliant with HEP 5/25/2017  2. The patient will attain 0 degrees knee extension to maximize ease of ambulation Near met grossly 6/6/2017. Near met lacking 2/1 degrees active/passive 6/20/2017  Long Term Goals: To be accomplished in 4 weeks:   1. The patient will improve FOTO score to 66 to maximize quality of life. Progressed 48 6/16/2017.  2. The patient will improve knee flexion to 90 degrees per MD protocol for improved ease of transfers. Grossly met 6/6/2017; Met 90 degrees 6/13/2017. 3. The patient will attain SLR without quad lag for improved stability during stance. Met - 6/13/2017. 4. The patient will improve HS strength to 4+/5 MMT to maximize transfer ease. Near met 4/5 6/20/2017    PLAN  []  Upgrade activities as tolerated     [x]  Continue plan of care  []  Update interventions per flow sheet       []  Discharge due to:_  [x]  Other:_Decrease frequency to 0-1x/week until pt f/u with his surgeon and is cleared to progress into closed chain activity to progress strengthening, then will return to 2x/week. Pt will continue HEP until then.       Sandra Rouse, PT, DPT, ATC, CSCS 6/20/2017  5:12 PM    Future Appointments  Date Time Provider Dipesh Freeman   6/27/2017 6:00 PM [address] [place] [address] [facility]   6/29/2017 6:00 PM Yonatan Goodson, PT [facility]   7/6/2017 6:00 PM Yonatan Goodson PT [facility]

## 2017-06-20 NOTE — PROGRESS NOTES
[facility]  [address]  [place], [address]  [phone] fax    Physical Therapy Progress Note  Patient name: Jolene Troy Start of Care: 2017   Referral source: Katie Polo MD : 1990   Medical/Treatment Diagnosis: Right knee pain [M25.561] Onset Date:2017     Prior Hospitalization: see medical history Provider#: [id number]   Medications: Verified on Patient Summary List    Comorbidities: R ACL reconstruction/meniscus reconstruction  Prior Level of Function: Inability to run due to pain and popping. Visits from Start of Care: 9    Missed Visits: 0      Established Goals:        Excellent         Good         Limited            None  [] Increased ROM   []  [x]  []  []  [] Increased Strength  []  [x]  []  []  [] Increased Mobility  []  []  []  []   [] Decreased Pain   []  [x]  []  []  [] Decreased Swelling  []  []  [x]  []    Key Functional Changes:     Knee ext AROM/PROM: 2/1 degree     HS MMT: 4/5     Knee Flexion to 90 degrees with ease     Minimal R knee effusion, no greater than previously noted     Good quad set, SLR void of quad lag    Updated Goals: to be achieved in 4 weeks:   1. Pt will demonstrate knee AROM 0 -120 degrees to facilitate ease of obstacle negotiation. 2. Pt will demonstrate 5/5 knee strength to improve stability during functional tasks. 3. Pt will demonstrate squat to 80 degrees knee flexion void of asymmetry to facilitate functional strengthening. 4. Pt will demonstrate 6 inch eccentric step down void of compensatory movement to facilitate improved eccentric quad control and knee stability. ASSESSMENT/RECOMMENDATIONS: Pt has progressed well with knee ROM with minimal TKE deficits of 1-2 degrees and achieves 90 degrees knee flexion with ease. Demonstrates gradually improving quad and HS strength with good quad set and no quad lag with SLR.  At this time, pt will decrease PT frequency to no more than 1x/week pending MD f/u and clearance to begin progression into closed chain strengthening activity, at which point pt will return to 2x/week treatment frequency. []Continue therapy per initial plan/protocol at a frequency of  2 x per week for 4 weeks  []Continue therapy with the following recommended changes:_____________________      _____________________________________________________________________  []Discontinue therapy progressing towards or have reached established goals  []Discontinue therapy due to lack of appreciable progress towards goals  []Discontinue therapy due to lack of attendance or compliance  []Await Physician's recommendations/decisions regarding therapy  []Other:________________________________________________________________    Thank you for this referral.    Christiano Fischer 6/20/2017 5:56 PM  NOTE TO PHYSICIAN:  PLEASE COMPLETE THE ORDERS BELOW AND   FAX TO [facility]: [phone]  If you are unable to process this request in 24 hours please contact our office: [phone]    ? I have read the above report and request that my patient continue as recommended. ? I have read the above report and request that my patient continue therapy with the following changes/special instructions:__________________________________________________________  ? I have read the above report and request that my patient be discharged from therapy.     Physicians signature: ______________________________Date: ______Time:______

## 2017-06-27 ENCOUNTER — APPOINTMENT (OUTPATIENT)
Dept: PHYSICAL THERAPY | Age: 27
End: 2017-06-27
Payer: OTHER GOVERNMENT

## 2017-06-29 ENCOUNTER — APPOINTMENT (OUTPATIENT)
Dept: PHYSICAL THERAPY | Age: 27
End: 2017-06-29
Payer: OTHER GOVERNMENT

## 2017-07-06 ENCOUNTER — HOSPITAL ENCOUNTER (OUTPATIENT)
Dept: PHYSICAL THERAPY | Age: 27
Discharge: HOME | End: 2017-07-06
Payer: OTHER GOVERNMENT

## 2017-07-06 PROCEDURE — 97140 MANUAL THERAPY 1/> REGIONS: CPT

## 2017-07-06 PROCEDURE — 97110 THERAPEUTIC EXERCISES: CPT

## 2017-07-06 NOTE — PROGRESS NOTES
PT DAILY TREATMENT NOTE     Patient Name: Aubry Essex  Date:2017  : 1990  [x]  Patient  Verified  Payor:  / Plan: [facility]  ACTIVE DUTY AND DEPENDENTS / Product Type: Faith Rodo /    In time:6:00  Out time:6:50  Total Treatment Time (min): 50  Visit #: 10 of 10-12    Treatment Area: Right knee pain [M25.561]    SUBJECTIVE  Pain Level (0-10 scale): 0/10  Any medication changes, allergies to medications, adverse drug reactions, diagnosis change, or new procedure performed?: [x] No    [] Yes (see summary sheet for update)  Subjective functional status/changes:   [] No changes reported  The patient states that he did receive a script from MD, but that he forgot it. He reports the doctor unlocked brace to 70 degrees allowing more movement in weight bearing. OBJECTIVE  42 min Therapeutic Exercise:  [x] See flow sheet :   Rationale: increase ROM and increase strength to improve the patients ability to improve ADL ease. 8 min Manual Therapy:  PROM - extension, flexion   Rationale: decrease pain, increase ROM and increase tissue extensibility to improve ADL ease. With   [] TE   [] TA   [] neuro   [] other: Patient Education: [x] Review HEP    [] Progressed/Changed HEP based on:   [] positioning   [] body mechanics   [] transfers   [] heat/ice application    [] other:      Other Objective/Functional Measures:   Hip ABD: 4+/5 MMT  Knee ROM: 2-120 degrees passively. Pain Level (0-10 scale) post treatment: 0/10    ASSESSMENT/Changes in Function: The patient is progressing well. Added 6\" step ups, mini squats, and initiated 4\" step downs in // bars to progress quad stability. To continue PT in order to progress closed chain strengthening.     Patient will continue to benefit from skilled PT services to modify and progress therapeutic interventions, address functional mobility deficits, address ROM deficits, address strength deficits, analyze and address soft tissue restrictions, analyze and cue movement patterns, analyze and modify body mechanics/ergonomics, assess and modify postural abnormalities and instruct in home and community integration to attain remaining goals. []  See Plan of Care  []  See progress note/recertification  []  See Discharge Summary         Progress towards goals / Updated goals:  Updated Goals: to be achieved in 4 weeks:                        1. Pt will demonstrate knee AROM 0 -120 degrees to facilitate ease of obstacle negotiation. Nearly met 2-120 7/06/2017                        2. Pt will demonstrate 5/5 knee strength to improve stability during functional tasks. 3. Pt will demonstrate squat to 80 degrees knee flexion void of asymmetry to facilitate functional strengthening. 4. Pt will demonstrate 6 inch eccentric step down void of compensatory movement to facilitate improved eccentric quad control and knee stability.  Progressed to 4\" 7/06/2017    PLAN  []  Upgrade activities as tolerated     [x]  Continue plan of care  []  Update interventions per flow sheet       []  Discharge due to:_  []  Other:_      Andres Peterson, PT 7/6/2017  7:00 PM    Future Appointments  Date Time Provider Dipesh Freeman   7/11/2017 6:00 PM [address] [facility]   7/14/2017 5:00 PM [address] [facility]   7/18/2017 6:00 PM [address] [facility]   7/19/2017 5:30 PM [address] [facility]   7/26/2017 5:30 PM [address] [facility]   7/28/2017 6:00 PM Esperanza Banda [facility]

## 2017-07-11 ENCOUNTER — HOSPITAL ENCOUNTER (OUTPATIENT)
Dept: PHYSICAL THERAPY | Age: 27
Discharge: HOME | End: 2017-07-11
Payer: OTHER GOVERNMENT

## 2017-07-11 PROCEDURE — 97530 THERAPEUTIC ACTIVITIES: CPT

## 2017-07-11 PROCEDURE — 97112 NEUROMUSCULAR REEDUCATION: CPT

## 2017-07-11 PROCEDURE — 97110 THERAPEUTIC EXERCISES: CPT

## 2017-07-11 NOTE — PROGRESS NOTES
PT DAILY TREATMENT NOTE     Patient Name: Vannessa Murphy  Date:2017  : 1990  [x]  Patient  Verified  Payor:  / Plan: [employer] [facility] [address] AND DEPENDENTS / Product Type:  /    In time:5:59pm  Out time:6:40pm  Total Treatment Time (min): 41  Visit #: 1 of 8    Treatment Area: Right knee pain [M25.561]    SUBJECTIVE  Pain Level (0-10 scale): 2  Any medication changes, allergies to medications, adverse drug reactions, diagnosis change, or new procedure performed?: [x] No    [] Yes (see summary sheet for update)  Subjective functional status/changes:   [] No changes reported  Pt reports he has been using a neoprene sleeve at work as his knee felt fine, he is not active at work, and reports he was told he could \"wean from the brace\" \"when he feels ready\" per his physician (per pt report). OBJECTIVE    25 min Therapeutic Exercise:  [x] See flow sheet :   Rationale: increase ROM and increase strength to improve the patients ability to improve ease of ADLs. 8 min Neuromuscular Re-education:  [x]  See flow sheet :   Rationale: increase strength, improve coordination and increase proprioception  to improve the patients ability to improve eccentric quad motor control, stability on RLE.      8 min Therapeutic Activity:  []  See flow sheet :   Rationale: increase strength, improve coordination and increase proprioception  to improve the patients ability to progress into closed chain functional activity including squatting and lifting  PD modalities          With   [] TE   [] TA   [] neuro   [] other: Patient Education: [x] Review HEP    [] Progressed/Changed HEP based on:   [] positioning   [] body mechanics   [] transfers   [] heat/ice application    [] other:      Other Objective/Functional Measures:      Pain Level (0-10 scale) post treatment: 3    ASSESSMENT/Changes in Function: Pt reports mild increase in soreness deep in R knee post interventions, but demonstrates good valgus knee stability during closed chain activity and minimal impairments in eccentric quad control. He does report some discomfort intermittently with TKE in closed chain. Continue per POC and assess pt response to progressed closed chain strengthening NV. Patient will continue to benefit from skilled PT services to modify and progress therapeutic interventions, address functional mobility deficits, address ROM deficits, address strength deficits, analyze and address soft tissue restrictions, analyze and cue movement patterns, analyze and modify body mechanics/ergonomics and instruct in home and community integration to attain remaining goals. []  See Plan of Care  []  See progress note/recertification  []  See Discharge Summary         Progress towards goals / Updated goals:  Updated Goals: to be achieved in 4 weeks:                        1. Pt will demonstrate knee AROM 0 -120 degrees to facilitate ease of obstacle negotiation. Nearly met 2-120 7/06/2017                        2. Pt will demonstrate 5/5 knee strength to improve stability during functional tasks.                        3. Pt will demonstrate squat to 80 degrees knee flexion void of asymmetry to facilitate functional strengthening. Progressing, demonstrates symmetrical squat to ~70 degrees depth 7/11/2017                        4. Pt will demonstrate 6 inch eccentric step down void of compensatory movement to facilitate improved eccentric quad control and knee stability.  Progressed to 4\" 7/06/2017    PLAN  []  Upgrade activities as tolerated     [x]  Continue plan of care  []  Update interventions per flow sheet       []  Discharge due to:_  []  Other:_      Benji Hemphill, PT, DPT, ATC, CSCS 7/11/2017  6:16 PM    Future Appointments  Date Time Provider Dipesh Freeman   7/14/2017 5:00 PM [address] [place] [address]   7/18/2017 6:00 PM [address] [place] [address]   7/19/2017 5:30 PM [address] [place] [address]   7/26/2017 5:30 PM Manuel Schultz [facility]   7/28/2017 6:00 PM [address]

## 2017-07-14 ENCOUNTER — HOSPITAL ENCOUNTER (OUTPATIENT)
Dept: PHYSICAL THERAPY | Age: 27
Discharge: HOME | End: 2017-07-14
Payer: OTHER GOVERNMENT

## 2017-07-14 PROCEDURE — 97110 THERAPEUTIC EXERCISES: CPT

## 2017-07-14 PROCEDURE — 97112 NEUROMUSCULAR REEDUCATION: CPT

## 2017-07-14 NOTE — PROGRESS NOTES
PT DAILY TREATMENT NOTE     Patient Name: Jennifer Tolliver  Date:2017  : 1990  [x]  Patient  Verified  Payor:  / Plan: [employer] [facility] [address] AND DEPENDENTS / Product Type:  /    In time:5:00pm  Out time:5:30pm  Total Treatment Time (min): 30   1:1 time: 25  Visit #: 2 of 8    Treatment Area: Right knee pain [M25.561]    SUBJECTIVE  Pain Level (0-10 scale): 0  Any medication changes, allergies to medications, adverse drug reactions, diagnosis change, or new procedure performed?: [x] No    [] Yes (see summary sheet for update)  Subjective functional status/changes:   [] No changes reported  Pt reports minimal R knee soreness post last visit that resolved in < 24 hours. Reports no knee discomfort upon arrival.    OBJECTIVE  17 min Therapeutic Exercise:  [x] See flow sheet :   Rationale: increase strength and improve coordination to improve the patients ability to improve ease of ADLs and progress towards return to recreational activity. 13 min Neuromuscular Re-education:  [x]  See flow sheet :   Rationale: increase strength, improve coordination and increase proprioception  to improve the patients ability to improve functional stability during daily activity. With   [] TE   [] TA   [] neuro   [] other: Patient Education: [x] Review HEP    [] Progressed/Changed HEP based on:   [] positioning   [] body mechanics   [] transfers   [] heat/ice application    [] other:      Other Objective/Functional Measures:     Progressed resistance and reps per flow    Added good mornings with 18# weight, good technique, reports mild R HS soreness post performance     Pain Level (0-10 scale) post treatment: 0    ASSESSMENT/Changes in Function: Pt reports some fatigue and R HS soreness post but denies overt pain. Demonstrates good functional movement void of signficant compensation during interventions. Will continue to progress as tolerated.     Patient will continue to benefit from skilled PT services to modify and progress therapeutic interventions, address functional mobility deficits, address ROM deficits, address strength deficits, analyze and cue movement patterns, analyze and modify body mechanics/ergonomics, address imbalance/dizziness and instruct in home and community integration to attain remaining goals. []  See Plan of Care  []  See progress note/recertification  []  See Discharge Summary         Progress towards goals / Updated goals:  Updated Goals: to be achieved in 4 weeks:                        1. Pt will demonstrate knee AROM 0 -120 degrees to facilitate ease of obstacle negotiation. Nearly met 2-120 7/06/2017                        2. Pt will demonstrate 5/5 knee strength to improve stability during functional tasks.                        3. Pt will demonstrate squat to 80 degrees knee flexion void of asymmetry to facilitate functional strengthening. Progressing, demonstrates symmetrical squat to ~70 degrees depth 7/11/2017                        4. Pt will demonstrate 6 inch eccentric step down void of compensatory movement to facilitate improved eccentric quad control and knee stability.  Progressed to 4\" 7/06/2017    PLAN  [x]  Upgrade activities as tolerated     [x]  Continue plan of care  []  Update interventions per flow sheet       []  Discharge due to:_  [x]  Other:_Progress within protocol restrictions      Tova Cota, PT, DPT, ATC, CSCS 7/14/2017  5:32 PM    Future Appointments  Date Time Provider Dipesh Freeman   7/18/2017 6:00 PM [address] [place] [address]   7/20/2017 5:30 PM An Roberts PT [facility]   7/25/2017 5:30 PM [address] [place] [address]   7/27/2017 6:00 PM Tova Cota [facility]

## 2017-07-18 ENCOUNTER — HOSPITAL ENCOUNTER (OUTPATIENT)
Dept: PHYSICAL THERAPY | Age: 27
Discharge: HOME | End: 2017-07-18
Payer: OTHER GOVERNMENT

## 2017-07-18 PROCEDURE — 97110 THERAPEUTIC EXERCISES: CPT

## 2017-07-18 PROCEDURE — 97530 THERAPEUTIC ACTIVITIES: CPT

## 2017-07-18 PROCEDURE — 97112 NEUROMUSCULAR REEDUCATION: CPT

## 2017-07-18 NOTE — PROGRESS NOTES
PT DAILY TREATMENT NOTE - [facility]     Patient Name: Harman Frankel  Date:2017  : 1990  [x]  Patient  Verified  Payor:  / Plan: [facility]  ACTIVE DUTY AND DEPENDENTS / Product Type: [employer] /    In time:6:00pm  Out time:6:50pm  Total Treatment Time (min): 50  Total Timed Codes (min): 50  1:1 Treatment Time ([address] only): 50   Visit #: 4 of 8    Treatment Area: Right knee pain [M25.561]    SUBJECTIVE  Pain Level (0-10 scale): 0  Any medication changes, allergies to medications, adverse drug reactions, diagnosis change, or new procedure performed?: [x] No    [] Yes (see summary sheet for update)  Subjective functional status/changes:   [] No changes reported  \"I was a little sore in my core after last time, but my knee felt good. \"    OBJECTIVE    32 min Therapeutic Exercise:  [x] See flow sheet :   Rationale: increase strength and improve coordination to improve the patients ability to improve ease of daily activity and progress     10 min Neuromuscular Re-education:  [x]  See flow sheet :   Rationale: increase strength, improve coordination, improve balance and increase proprioception  to improve the patients ability to improve stability and quad motor control during activity. 8 min Therapeutic Activity:  [x]  See flow sheet :   Rationale: increase strength, improve coordination and increase proprioception  to improve the patients ability to facilitate progression into functional squatting, lifting activity. With   [] TE   [] TA   [] neuro   [] other: Patient Education: [x] Review HEP    [] Progressed/Changed HEP based on:   [] positioning   [] body mechanics   [] transfers   [] heat/ice application    [] other:      Other Objective/Functional Measures:     Difficulty with eccentric step down quad control with some hip compensatory motion, improves with cueing. Pain Level (0-10 scale) post treatment: 0    ASSESSMENT/Changes in Function: Pt progressing well.  Demonstrates core fatigue with interventions and some eccentric quad control impairments as well as reported ant knee discomfort with deep squat approaching 80 degrees depth. Pt reports he will  Be away for 2 weeks later this summer coming up d/t [employer] obligations. Advised him to let us know and we can update his HEP in preparation in order to continue self management and progress strengthening independently. Patient will continue to benefit from skilled PT services to modify and progress therapeutic interventions, address functional mobility deficits, address strength deficits, analyze and cue movement patterns and instruct in home and community integration to attain remaining goals. []  See Plan of Care  []  See progress note/recertification  []  See Discharge Summary         Progress towards goals / Updated goals:  Updated Goals: to be achieved in 4 weeks:                        1. Pt will demonstrate knee AROM 0 -120 degrees to facilitate ease of obstacle negotiation. Nearly met 2-120 7/06/2017                        2. Pt will demonstrate 5/5 knee strength to improve stability during functional tasks.                        3. Pt will demonstrate squat to 80 degrees knee flexion void of asymmetry to facilitate functional strengthening. Progressing, demonstrates symmetrical squat to ~70 degrees depth 7/11/2017                         4. Pt will demonstrate 6 inch eccentric step down void of compensatory movement to facilitate improved eccentric quad control and knee stability.  Progressed to 4\" 7/06/2017 Mild compensatory movement on 4 inch, improves with cueing 7/18/2017    PLAN  [x]  Upgrade activities as tolerated     [x]  Continue plan of care  []  Update interventions per flow sheet       []  Discharge due to:_  []  Other:_      Saumya Baker, PT, DPT, ATC, CSCS 7/18/2017  5:22 PM    Future Appointments  Date Time Provider Dipesh Freeman   7/18/2017 6:00 PM [address] [place] [address]   7/20/2017 5:30 PM Jude Mayfield, PT [facility]   7/25/2017 5:30 PM [address] [facility]   7/27/2017 6:00 PM Vernel Goodpasture [facility]

## 2017-07-19 ENCOUNTER — APPOINTMENT (OUTPATIENT)
Dept: PHYSICAL THERAPY | Age: 27
End: 2017-07-19
Payer: OTHER GOVERNMENT

## 2017-07-20 ENCOUNTER — HOSPITAL ENCOUNTER (OUTPATIENT)
Dept: PHYSICAL THERAPY | Age: 27
Discharge: HOME | End: 2017-07-20
Payer: OTHER GOVERNMENT

## 2017-07-20 PROCEDURE — 97112 NEUROMUSCULAR REEDUCATION: CPT

## 2017-07-20 PROCEDURE — 97110 THERAPEUTIC EXERCISES: CPT

## 2017-07-20 NOTE — PROGRESS NOTES
PT DAILY TREATMENT NOTE     Patient Name: Vannessa Murphy  Date:2017  : 1990  [x]  Patient  Verified  Payor:  / Plan: [employer] [facility] [address] AND DEPENDENTS / Product Type:  /    In time:5:30  Out time:6:18  Total Treatment Time (min): 48  Visit #: 5 of 8    Treatment Area: Right knee pain [M25.561]    SUBJECTIVE  Pain Level (0-10 scale): 1/10  Any medication changes, allergies to medications, adverse drug reactions, diagnosis change, or new procedure performed?: [x] No    [] Yes (see summary sheet for update)  Subjective functional status/changes:   [] No changes reported  The patient states that his knee is a little sore, but doing well overall. OBJECTIVE  38 min Therapeutic Exercise:  [x] See flow sheet :   Rationale: increase ROM and increase strength to improve the patients ability to improve ADL ease. 10 min Neuromuscular Re-education:  []  See flow sheet :   Rationale: improve coordination, improve balance and increase proprioception  to improve the patients ability to improve ADL ease. With   [] TE   [] TA   [] neuro   [] other: Patient Education: [x] Review HEP    [] Progressed/Changed HEP based on:   [] positioning   [] body mechanics   [] transfers   [] heat/ice application    [] other:      Other Objective/Functional Measures:     Pain Level (0-10 scale) post treatment: 2/10    ASSESSMENT/Changes in Function: Noted quad weakness during step downs. Fatigued following session through gastrocs with gradual regression of single leg stability following ball toss due to aforementioned fatigue. Pt progressing well overall with anticipated upcoming vacation.      Patient will continue to benefit from skilled PT services to modify and progress therapeutic interventions, address functional mobility deficits, address ROM deficits, address strength deficits, analyze and address soft tissue restrictions, analyze and cue movement patterns, analyze and modify body mechanics/ergonomics, assess and modify postural abnormalities and instruct in home and community integration to attain remaining goals. []  See Plan of Care  []  See progress note/recertification  []  See Discharge Summary         Progress towards goals / Updated goals:  Updated Goals: to be achieved in 4 weeks:                        1. Pt will demonstrate knee AROM 0 -120 degrees to facilitate ease of obstacle negotiation. Nearly met 2-120 7/06/2017                        2. Pt will demonstrate 5/5 knee strength to improve stability during functional tasks.                        3. Pt will demonstrate squat to 80 degrees knee flexion void of asymmetry to facilitate functional strengthening. Progressing, demonstrates symmetrical squat to ~70 degrees depth 7/11/2017                         4. Pt will demonstrate 6 inch eccentric step down void of compensatory movement to facilitate improved eccentric quad control and knee stability.  Progressed to 4\" 7/06/2017 Mild compensatory movement on 4 inch, improves with cueing 7/18/2017    PLAN  []  Upgrade activities as tolerated     [x]  Continue plan of care  []  Update interventions per flow sheet       []  Discharge due to:_  []  Other:_      Cristela Myles, PT 7/20/2017  7:04 PM    Future Appointments  Date Time Provider Dipesh Freeman   7/25/2017 5:30 PM [address] [facility]   7/27/2017 6:00 PM [address] [facility]

## 2017-07-25 ENCOUNTER — APPOINTMENT (OUTPATIENT)
Dept: PHYSICAL THERAPY | Age: 27
End: 2017-07-25
Payer: OTHER GOVERNMENT

## 2017-07-26 ENCOUNTER — APPOINTMENT (OUTPATIENT)
Dept: PHYSICAL THERAPY | Age: 27
End: 2017-07-26
Payer: OTHER GOVERNMENT

## 2017-07-27 ENCOUNTER — HOSPITAL ENCOUNTER (OUTPATIENT)
Dept: PHYSICAL THERAPY | Age: 27
Discharge: HOME | End: 2017-07-27
Payer: OTHER GOVERNMENT

## 2017-07-27 PROCEDURE — 97110 THERAPEUTIC EXERCISES: CPT

## 2017-07-27 PROCEDURE — 97530 THERAPEUTIC ACTIVITIES: CPT

## 2017-07-27 PROCEDURE — 97112 NEUROMUSCULAR REEDUCATION: CPT

## 2017-07-27 NOTE — PROGRESS NOTES
PT DAILY TREATMENT NOTE     Patient Name: Teo Jon  Date:2017  : 1990  [x]  Patient  Verified  Payor:  / [facility] [place]  ACTIVE DUTY AND DEPENDENTS / Product Type:  /    In time:6:00pm  Out time:6:52pm  Total Treatment Time (min): 52  Visit #: 5 of 8    Treatment Area: Right knee pain [M25.561]    SUBJECTIVE  Pain Level (0-10 scale): 0  Any medication changes, allergies to medications, adverse drug reactions, diagnosis change, or new procedure performed?: [x] No    [] Yes (see summary sheet for update)  Subjective functional status/changes:   [x] No changes reported    OBJECTIVE  34 min Therapeutic Exercise:  [x] See flow sheet :   Rationale: increase ROM and increase strength to improve the patients ability to improve easer of daily activity. 10 min Neuromuscular Re-education:  [x]  See flow sheet :   Rationale: improve coordination, improve balance and increase proprioception  to improve the patients ability to improve ease of daily activity. 8 min Therapeutic Activity:  [x]  See flow sheet : lifting squatting tasks   Rationale: increase strength, improve coordination and increase proprioception  to improve the patients ability to improve functional lifting and progress into higher level strengthening activity. With   [] TE   [] TA   [] neuro   [] other: Patient Education: [x] Review HEP    [] Progressed/Changed HEP based on:   [] positioning   [] body mechanics   [] transfers   [] heat/ice application    [] other:      Other Objective/Functional Measures: FOTO: 60     Pain Level (0-10 scale) post treatment: 1    ASSESSMENT/Changes in Function: Pt reports occasional clicking sensation during deep squats, but improves with cueing to avoid deep range beyond 90 degrees. Progressed into barbell squats with fair technique after initial cue for R weight shift, notable fatigue towards end of interventions greater in the RLE.  Demonstrates improved self reported functional status per [facility] and is progressing well per protocol. Patient will continue to benefit from skilled PT services to modify and progress therapeutic interventions, address functional mobility deficits, address ROM deficits, address strength deficits, analyze and address soft tissue restrictions, analyze and cue movement patterns, analyze and modify body mechanics/ergonomics, assess and modify postural abnormalities and instruct in home and community integration to attain remaining goals. []  See Plan of Care  []  See progress note/recertification  []  See Discharge Summary         Progress towards goals / Updated goals:  Updated Goals: to be achieved in 4 weeks:                        1. Pt will demonstrate knee AROM 0 -120 degrees to facilitate ease of obstacle negotiation. Nearly met 2-120 7/06/2017                        2. Pt will demonstrate 5/5 knee strength to improve stability during functional tasks.                         3. Pt will demonstrate squat to 80 degrees knee flexion void of asymmetry to facilitate functional strengthening. Progressing, demonstrates symmetrical squat to ~70 degrees depth 7/11/2017 Met when performing unweighted 7/27/2017                        4. Pt will demonstrate 6 inch eccentric step down void of compensatory movement to facilitate improved eccentric quad control and knee stability. Progressed to 4\" 7/06/2017 Mild compensatory movement on 4 inch, improves with cueing 7/18/2017    PLAN  []  Upgrade activities as tolerated     [x]  Continue plan of care  []  Update interventions per flow sheet       []  Discharge due to:_  []  Other:_      Joanna Dillon, PT, DPT, ATC, CSCS 7/27/2017  6:22 PM    No future appointments.

## 2017-07-28 ENCOUNTER — APPOINTMENT (OUTPATIENT)
Dept: PHYSICAL THERAPY | Age: 27
End: 2017-07-28
Payer: OTHER GOVERNMENT

## 2017-08-17 ENCOUNTER — HOSPITAL ENCOUNTER (OUTPATIENT)
Dept: PHYSICAL THERAPY | Age: 27
Discharge: HOME | End: 2017-08-17
Payer: OTHER GOVERNMENT

## 2017-08-17 PROCEDURE — 97110 THERAPEUTIC EXERCISES: CPT

## 2017-08-17 PROCEDURE — 97112 NEUROMUSCULAR REEDUCATION: CPT

## 2017-08-17 NOTE — PROGRESS NOTES
PT DAILY TREATMENT NOTE     Patient Name: Wili Oviedo  Date:2017  : 1990  [x]  Patient  Verified  Payor: [facility] / Plan: [facility] [address] AND DEPENDENTS / Product Type: Christianne Leventhal /    In time:5:02  Out time:6:00  Total Treatment Time (min): 62  Visit #: 6 of 8    Treatment Area: Right knee pain [M25.561]    SUBJECTIVE  Pain Level (0-10 scale): 0/10  Any medication changes, allergies to medications, adverse drug reactions, diagnosis change, or new procedure performed?: [x] No    [] Yes (see summary sheet for update)  Subjective functional status/changes:   [] No changes reported  The patient states that he feels like the clicking has gone away in his knee, he also adds that he does have some discomfort with kneeling. OBJECTIVE  Modality rationale: decrease edema, decrease inflammation and decrease pain to improve the patients ability to improve ADL ease. Min Type Additional Details   10 [x]  Cold pack  R knee   [] Skin assessment post-treatment:  []intact []redness- no adverse reaction    []redness  adverse reaction:      38 min Therapeutic Exercise:  [x] See flow sheet :   Rationale: increase ROM and increase strength to improve the patients ability to improve ADL ease. 10 min Neuromuscular Re-education:  [x]  See flow sheet :   Rationale: improve coordination, improve balance and increase proprioception  to improve the patients ability to improve ADL ease.      With   [] TE   [] TA   [] neuro   [] other: Patient Education: [x] Review HEP    [] Progressed/Changed HEP based on:   [] positioning   [] body mechanics   [] transfers   [] heat/ice application    [] other:      Other Objective/Functional Measures:   Knee ROM AROM 2-128   Step downs 4\" with hip drop and quad weakness noted in closed chain  HS strength: 5/5 MMT  Quad strength: 5/5 MMT    Pain Level (0-10 scale) post treatment: 2/10    ASSESSMENT/Changes in Function: The patient has progressed with step downs 4\", good progress with quad strength at end ranges, but noted weakness and control in closed chain. Initiated jogging today, but noted patellofemoral discomfort with 2' of jogging. Held further jogging due tot his discomfort. Plan to continue progressing quad strengthen with gradual implementation of jogging per protocol. Patient will continue to benefit from skilled PT services to modify and progress therapeutic interventions, address functional mobility deficits, address ROM deficits, address strength deficits, analyze and address soft tissue restrictions, analyze and cue movement patterns, analyze and modify body mechanics/ergonomics, assess and modify postural abnormalities and instruct in home and community integration to attain remaining goals. []  See Plan of Care  []  See progress note/recertification  []  See Discharge Summary         Progress towards goals / Updated goals:  Updated Goals: to be achieved in 4 weeks:                        1. Pt will demonstrate knee AROM 0 -120 degrees to facilitate ease of obstacle negotiation. Nearly met 2-120 7/06/2017 Nearly met 8/17/2017                        2. Pt will demonstrate 5/5 knee strength to improve stability during functional tasks. Met 5/5 MMT 8/17/2017                        3. Pt will demonstrate squat to 80 degrees knee flexion void of asymmetry to facilitate functional strengthening. Progressing, demonstrates symmetrical squat to ~70 degrees depth 7/11/2017 Met when performing unweighted 7/27/2017                        4. Pt will demonstrate 6 inch eccentric step down void of compensatory movement to facilitate improved eccentric quad control and knee stability.  Progressed to 4\" 7/06/2017 Mild compensatory movement on 4 inch, improves with cueing 7/18/2017 Not met, compensation noted 8/17/2017    PLAN  []  Upgrade activities as tolerated     [x]  Continue plan of care  []  Update interventions per flow sheet       []  Discharge due to:_  []  Other:_ Janet De Paz, PT 8/17/2017  6:36 PM    Future Appointments  Date Time Provider Dipesh Freeman   8/24/2017 5:00 PM [address] [facility]   8/28/2017 5:30 PM [address] [facility]   9/7/2017 5:30 PM [address] [facility]   9/11/2017 6:00 PM Janet De Paz, PT [facility]

## 2017-08-17 NOTE — PROGRESS NOTES
[facility]  [address]  [place], [address]  [phone] fax    Physical Therapy Progress Note  Patient name: Aubry Essex Start of Care: 2017   Referral source: Lela Huang MD : 1990   Medical/Treatment Diagnosis: Right knee pain [M25.561] Onset Date:2017   Prior Hospitalization: see medical history Provider#: [id number]   Medications: Verified on Patient Summary List     Comorbidities: R ACL reconstruction/meniscus reconstruction  Prior Level of Function: Inability to run due to pain and popping. Visits from Start of Care: 16    Missed Visits: 0    Key Functional Changes: The patient has progressed with step downs 4\", good progress with quad strength at end ranges, but noted weakness and control in closed chain. Initiated jogging today, but noted patellofemoral discomfort with 2' of jogging. Held further jogging due tot his discomfort. Plan to continue progressing quad strengthen with gradual implementation of jogging per protocol. Knee ROM AROM 2-128   Step downs 4\" with hip drop and quad weakness noted in closed chain  HS strength: 5/5 MMT  Quad strength: 5/5 MMT    Updated Goals: to be achieved in 4 weeks:                        1. Pt will demonstrate knee AROM 0 -120 degrees to facilitate ease of obstacle negotiation. Nearly met 2-120 2017 Nearly met 2017                        2. Pt will demonstrate 5/5 knee strength to improve stability during functional tasks. Met 5/5 MMT 2017                        3. Pt will demonstrate squat to 80 degrees knee flexion void of asymmetry to facilitate functional strengthening. Progressing, demonstrates symmetrical squat to ~70 degrees depth 2017 Met when performing unweighted 2017                        4. Pt will demonstrate 6 inch eccentric step down void of compensatory movement to facilitate improved eccentric quad control and knee stability. Progressed to 4\" 7/06/2017 Mild compensatory movement on 4 inch, improves with cueing 7/18/2017 Not met, compensation noted 8/17/2017     Updated Goals: to be achieved in 4 weeks:  1. The patient will demonstrate 6\" step downs void of compensation to maximize ambulation ease. 2. The patient will demonstrate 5 minutes of jogging on TM with no compensations and no more than 1 pain level elevation for progression of protocol. 3. The patient will improve FOTO score to 66 to maximize quality of life    ASSESSMENT/RECOMMENDATIONS:  [x]Continue therapy per initial plan/protocol at a frequency of  1 x per week for 4 weeks  []Continue therapy with the following recommended changes:_____________________      _____________________________________________________________________  []Discontinue therapy progressing towards or have reached established goals  []Discontinue therapy due to lack of appreciable progress towards goals  []Discontinue therapy due to lack of attendance or compliance  []Await Physician's recommendations/decisions regarding therapy  []Other:________________________________________________________________    Thank you for this referral.    Sami Anedrsen, PT 8/17/2017 6:45 PM  NOTE TO PHYSICIAN:  PLEASE COMPLETE THE ORDERS BELOW AND   FAX TO [facility]: [phone]  If you are unable to process this request in 24 hours please contact our office: [phone]    ? I have read the above report and request that my patient continue as recommended. ? I have read the above report and request that my patient continue therapy with the following changes/special instructions:__________________________________________________________  ? I have read the above report and request that my patient be discharged from therapy.     Physicians signature: ______________________________Date: ______Time:______

## 2017-08-24 ENCOUNTER — HOSPITAL ENCOUNTER (OUTPATIENT)
Dept: PHYSICAL THERAPY | Age: 27
Discharge: HOME | End: 2017-08-24
Payer: OTHER GOVERNMENT

## 2017-08-24 PROCEDURE — 97112 NEUROMUSCULAR REEDUCATION: CPT

## 2017-08-24 PROCEDURE — 97110 THERAPEUTIC EXERCISES: CPT

## 2017-08-24 NOTE — PROGRESS NOTES
PT DAILY TREATMENT NOTE     Patient Name: Jennifer Tolliver  Date:2017  : 1990  [x]  Patient  Verified  Payor:  / Plan: [facility] [facility] [address] AND DEPENDENTS / Product Type:  /    In time:5:00pm  Out time:5:50pm  Total Treatment Time (min): 36   1:1 time: 30  Visit #: 1 of 4    Treatment Area: Right knee pain [M25.561]    SUBJECTIVE  Pain Level (0-10 scale): 0  Any medication changes, allergies to medications, adverse drug reactions, diagnosis change, or new procedure performed?: [x] No    [] Yes (see summary sheet for update)  Subjective functional status/changes:   [] No changes reported  Pt reports no knee pain upon arrival. Arrived with script indicating to continue per protocol. OBJECTIVE    15 min Therapeutic Exercise:  [x] See flow sheet :   Rationale: increase ROM and increase strength to improve the patients ability to improve ease of ADLs. 25 min Neuromuscular Re-education:  [x]  See flow sheet :   Rationale: increase strength, improve coordination, improve balance and increase proprioception  to improve the patients ability to improve knee stability to facilitate return to recreational sport activity. Modality rationale: decrease inflammation and decrease pain to improve the patients ability to improve ADL ease.    Min Type Additional Details    [] Estim:  []Unatt       []IFC  []Premod                        []Other:  []w/ice   []w/heat  Position:  Location:    [] Estim: []Att    []TENS instruct  []NMES                    []Other:  []w/US   []w/ice   []w/heat  Position:  Location:    []  Traction: [] Cervical       []Lumbar                       [] Prone          []Supine                       []Intermittent   []Continuous Lbs:  [] before manual  [] after manual    []  Ultrasound: []Continuous   [] Pulsed                           []1MHz   []3MHz Location:  W/cm2:    []  Iontophoresis with dexamethasone         Location: [] Take home patch   [] In clinic 10 [x]  Ice     []  heat  []  Ice massage  []  Laser   []  Anodyne Position: long sit  Location: R knee    []  Laser with stim  []  Other: Position:  Location:    []  Vasopneumatic Device Pressure:       [] lo [] med [] hi   Temperature: [] lo [] med [] hi   [] Skin assessment post-treatment:  []intact []redness- no adverse reaction    []redness  adverse reaction:           With   [] TE   [] TA   [] neuro   [] other: Patient Education: [x] Review HEP    [] Progressed/Changed HEP based on:   [] positioning   [] body mechanics   [] transfers   [] heat/ice application    [x] other: Discussed appropriate knee strengthening exercises at the gym and home for self strength development. Other Objective/Functional Measures:   Attempted jogging, held after 1 min d/t knee pain reports \"in the middle\" of pt's knee with notable antalgic gait pattern during jog    Instability with steup down variations with mild knee discomfort with lateral step upreported     Pain Level (0-10 scale) post treatment: 1    ASSESSMENT/Changes in Function: Pt limited by knee pain with attempted jogging and occasionally reports mild knee discomfort with unilateral strengthening interventions. Continues to demonstrate mild instability and R knee weakness with unilateral interventions grossly assessed. Provided extensive pt education regarding bodyweight and light weight strengthening exercises to facilitate carryover into self management of strengthening including frequency during the week (!every other day), sets, reps, and exercise examples. Pt verbalized understanding.     Patient will continue to benefit from skilled PT services to modify and progress therapeutic interventions, address functional mobility deficits, address ROM deficits, address strength deficits, analyze and address soft tissue restrictions, analyze and cue movement patterns, analyze and modify body mechanics/ergonomics and instruct in home and community integration to attain remaining goals. []  See Plan of Care  []  See progress note/recertification  []  See Discharge Summary         Progress towards goals / Updated goals:  Updated Goals: to be achieved in 4 weeks:  1. The patient will demonstrate 6\" step downs void of compensation to maximize ambulation ease. 2. The patient will demonstrate 5 minutes of jogging on TM with no compensations and no more than 1 pain level elevation for progression of protocol.   3. The patient will improve FOTO score to 66 to maximize quality of life       PLAN  []  Upgrade activities as tolerated     []  Continue plan of care  []  Update interventions per flow sheet       []  Discharge due to:_  []  Other:_      Edyta Sena, PT, DPT, ATC, CSCS 8/24/2017  5:19 PM    Future Appointments  Date Time Provider Dipesh Freeman   8/28/2017 5:30 PM  [address] [facility]   9/7/2017 5:30 PM  [address] [facility]   9/11/2017 6:00 PM Amilcar Stapleton PT [facility]

## 2017-08-28 ENCOUNTER — HOSPITAL ENCOUNTER (OUTPATIENT)
Dept: PHYSICAL THERAPY | Age: 27
Discharge: HOME | End: 2017-08-28
Payer: OTHER GOVERNMENT

## 2017-08-28 PROCEDURE — 97110 THERAPEUTIC EXERCISES: CPT

## 2017-08-28 PROCEDURE — 97112 NEUROMUSCULAR REEDUCATION: CPT

## 2017-08-28 NOTE — PROGRESS NOTES
PT DAILY TREATMENT NOTE - [facility]     Patient Name: Sanket Craig  Date:2017  : 1990  [x]  Patient  Verified  Payor:  / Plan: [facility] DEPENDENTS / Product Type: Angely Velasquez /    In time:5:30pm  Out time:6:14pm  Total Treatment Time (min): 44  Total Timed Codes (min): 44  1:1 Treatment Time (): 32   Visit #: 2 of 4    Treatment Area: Right knee pain [M25.561]    SUBJECTIVE  Pain Level (0-10 scale): 2  Any medication changes, allergies to medications, adverse drug reactions, diagnosis change, or new procedure performed?: [x] No    [] Yes (see summary sheet for update)  Subjective functional status/changes:   [] No changes reported  Pt reports some onset of intermittent ant knee pain today after starting up walking after sitting for a prolonged period at work. He reports he was able to work out as instructed without complaints last week. OBJECTIVE  16 min Therapeutic Exercise:  [x] See flow sheet :   Rationale: increase ROM and increase strength to improve the patients ability to improve ease of ADLs. 28 min Neuromuscular Re-education:  [x]  See flow sheet :   Rationale: increase strength, improve coordination, improve balance and increase proprioception  to improve the patients ability to improve knee stability and return to recreational sport activity. With   [] TE   [] TA   [] neuro   [] other: Patient Education: [x] Review HEP    [] Progressed/Changed HEP based on:   [] positioning   [] body mechanics   [] transfers   [] heat/ice application    [x] other: verbally instructed pt to add SL RDLs to HEP performance and to perform multiple sets, starting with 2-3 of ~10 reps. Pt verbalized agreement. Other Objective/Functional Measures:     Added RDLs, demonstrates some pelvic rotation compensation and occasional trunk flexion compensation, but improves well with tactile and visual cueing     Pain Level (0-10 scale) post treatment: 2    ASSESSMENT/Changes in Function: Pt reports mild increase in ant knee pain starting earlier today at work, however, he does not appear limited by it during intervention performance. He demonstrates some mild instability with unilateral stance interventions with some pelvic rotation compensation with RDLs, but improved significantly with cueing and repetition. Educated pt regarding home exercise and gave examples of bodyweight exercise progressions. Patient will continue to benefit from skilled PT services to modify and progress therapeutic interventions, address functional mobility deficits, address ROM deficits, address strength deficits, analyze and address soft tissue restrictions, analyze and cue movement patterns, analyze and modify body mechanics/ergonomics and instruct in home and community integration to attain remaining goals. []  See Plan of Care  []  See progress note/recertification  []  See Discharge Summary         Progress towards goals / Updated goals:  Updated Goals: to be achieved in 4 weeks:  1. The patient will demonstrate 6\" step downs void of compensation to maximize ambulation ease. 2. The patient will demonstrate 5 minutes of jogging on TM with no compensations and no more than 1 pain level elevation for progression of protocol.   3. The patient will improve FOTO score to 66 to maximize quality of life    PLAN  [x]  Upgrade activities as tolerated     [x]  Continue plan of care  []  Update interventions per flow sheet       []  Discharge due to:_  []  Other:_      Anjana Perez, PT, DPT, ATC, CSCS 8/28/2017  6:00 PM    Future Appointments  Date Time Provider Dipesh Freeman   9/7/2017 5:30 PM [address] [place] [address]   9/11/2017 6:00 PM Remedios Menendez PT [facility]

## 2017-09-07 ENCOUNTER — HOSPITAL ENCOUNTER (OUTPATIENT)
Dept: PHYSICAL THERAPY | Age: 27
Discharge: HOME | End: 2017-09-07
Payer: OTHER GOVERNMENT

## 2017-09-07 PROCEDURE — 97110 THERAPEUTIC EXERCISES: CPT

## 2017-09-07 PROCEDURE — 97112 NEUROMUSCULAR REEDUCATION: CPT

## 2017-09-07 NOTE — PROGRESS NOTES
PT DAILY TREATMENT NOTE     Patient Name: David Dc  Date:2017  : 1990  [x]  Patient  Verified  Payor:  / Plan: [facility]  ACTIVE DUTY AND DEPENDENTS / Product Type:  /    In time: 5:30 pm  Out time: 6:21 pm  Total Treatment Time (min): 51   1:1 time: (20) 31  Visit #: 3 of 4    Treatment Area: Right knee pain [M25.561]    SUBJECTIVE  Pain Level (0-10 scale): 0  Any medication changes, allergies to medications, adverse drug reactions, diagnosis change, or new procedure performed?: [x] No    [] Yes (see summary sheet for update)  Subjective functional status/changes:   [] No changes reported  Pt reports biking \"a lot\" over the weekend and reports \"no problems\". OBJECTIVE  26 min Therapeutic Exercise:  [x] See flow sheet :   Rationale: increase strength and improve coordination to improve the patients ability to improve ease of daily activity and improve knee strength/endurance. 25 min Neuromuscular Re-education:  [x]  See flow sheet :   Rationale: increase strength, improve coordination and increase proprioception  to improve the patients ability to improve eccentric quad control, dynamic valgus stability, and facilitate progression into plyometrics. With   [] TE   [] TA   [] neuro   [] other: Patient Education: [x] Review HEP    [] Progressed/Changed HEP based on:   [] positioning   [] body mechanics   [] transfers   [] heat/ice application    [] other:      Other Objective/Functional Measures:     Reports occasional ant knee pain with unilateral closed chain strengthening, but abates immediately after cessation of activity.     Limited eccentric quad control when lowering unilaterally, particularly when fatigued    Attempted to progress into low level box jumps, limited eccentric landing control and limited jump height, pt reports hesitancy     Pain Level (0-10 scale) post treatment: 0    ASSESSMENT/Changes in Function: Pt demonstrates continued weakness and rapid onset of fatigue. Attempted low level plyometrics to facilitate progression towards running, however, pt demonstrates limited eccentric landing control and hesitancy. Advised pt to perform strength training circuits 2-3 days/week and will progress into low level plyo progression. Patient will continue to benefit from skilled PT services to modify and progress therapeutic interventions, address functional mobility deficits, address ROM deficits, address strength deficits, analyze and address soft tissue restrictions, analyze and cue movement patterns, analyze and modify body mechanics/ergonomics and instruct in home and community integration to attain remaining goals. []  See Plan of Care  []  See progress note/recertification  []  See Discharge Summary         Progress towards goals / Updated goals:  Updated Goals: to be achieved in 4 weeks:  1. The patient will demonstrate 6\" step downs void of compensation to maximize ambulation ease. Not met 9/7/2017  2. The patient will demonstrate 5 minutes of jogging on TM with no compensations and no more than 1 pain level elevation for progression of protocol. Not met 9/7/2017  3.  The patient will improve FOTO score to 66 to maximize quality of life    PLAN  [x]  Upgrade activities as tolerated     [x]  Continue plan of care  []  Update interventions per flow sheet       []  Discharge due to:_  []  Other:_      Deigo Lara, PT, DPT, ATC, CSCS 9/7/2017  5:43 PM    Future Appointments  Date Time Provider Dipesh Freeman   9/11/2017 6:00 PM Cristela Myles, PT [id number]

## 2017-09-11 ENCOUNTER — HOSPITAL ENCOUNTER (OUTPATIENT)
Dept: PHYSICAL THERAPY | Age: 27
Discharge: HOME | End: 2017-09-11
Payer: OTHER GOVERNMENT

## 2017-09-11 PROCEDURE — 97112 NEUROMUSCULAR REEDUCATION: CPT

## 2017-09-11 PROCEDURE — 97110 THERAPEUTIC EXERCISES: CPT

## 2017-09-11 NOTE — PROGRESS NOTES
[facility]  [address]  [place], [address]  [phone] fax    Physical Therapy Progress Note  Patient name: Aishwarya Rabago Start of Care: 2017   Referral source: Forest Florian MD : 1990   Medical/Treatment Diagnosis: Right knee pain [M25.561] Onset Date:2017   Prior Hospitalization: see medical history Provider#: [id number]   Medications: Verified on Patient Summary List      Comorbidities: R ACL reconstruction/meniscus reconstruction  Prior Level of Function: Inability to run due to pain and popping. Visits from Start of Care: 20    Missed Visits: 0    Key Functional Changes: The patient will continue to benefit from PT in order to progress quad strength and jogging ability. He continues to display quad fatigue and weakness as well as compensations, but improved over the past month. We have initiated jogging, but are limited by anterior knee pain. Progressing with double leg plyo which is being tolerated well, with gradual progression of jogging. Updated Goals: to be achieved in 4 weeks:  1. The patient will demonstrate 6\" step downs void of compensation to maximize ambulation ease. Not met 2017; Progressed to 6\", hip drop noted, but improved 2017.  2. The patient will demonstrate 5 minutes of jogging on TM with no compensations and no more than 1 pain level elevation for progression of protocol. Not met 2017; Not met, progressed to 1:30 seconds x 2 sets. 3. The patient will improve FOTO score to 66 to maximize quality of life. Progressed to 60 2017. Updated Goals: to be achieved in 4 weeks:  1. The patient will demonstrate 6\" step downs void of compensation to maximize ambulation ease.  Not met 2017; Progressed to 6\", hip drop noted, but improved 2017.  2. The patient will demonstrate 5 minutes of jogging on TM with no compensations and no more than 1 pain level elevation for progression of protocol. Not met 9/7/2017; Not met, progressed to 1:30 seconds x 2 sets. 3. The patient will improve FOTO score to 66 to maximize quality of life. Progressed to 60 9/11/2017. ASSESSMENT/RECOMMENDATIONS:  [x]Continue therapy per initial plan/protocol at a frequency of  1 x per week for 4 weeks  []Continue therapy with the following recommended changes:_____________________      _____________________________________________________________________  []Discontinue therapy progressing towards or have reached established goals  []Discontinue therapy due to lack of appreciable progress towards goals  []Discontinue therapy due to lack of attendance or compliance  []Await Physician's recommendations/decisions regarding therapy  []Other:________________________________________________________________    Thank you for this referral.    Nas Cornejo, PT 9/11/2017 7:06 PM  NOTE TO PHYSICIAN:  [address]   [facility]X TO [facility]: [phone]  If you are unable to process this request in 24 hours please contact our office: [phone]    ? I have read the above report and request that my patient continue as recommended. ? I have read the above report and request that my patient continue therapy with the following changes/special instructions:__________________________________________________________  ? I have read the above report and request that my patient be discharged from therapy.     Physicians signature: ______________________________Date: ______Time:______

## 2017-09-11 NOTE — PROGRESS NOTES
PT DAILY TREATMENT NOTE     Patient Name: Val Bonilla  Date:2017  : 1990  [x]  Patient  Verified  Payor:  / Plan: [facility]  ACTIVE DUTY AND DEPENDENTS / Product Type:  /    In time:5:59  Out time:7:00  Total Treatment Time (min): 61  Visit #: 4 of 4    Treatment Area: Right knee pain [M25.561]    SUBJECTIVE  Pain Level (0-10 scale): 0/10  Any medication changes, allergies to medications, adverse drug reactions, diagnosis change, or new procedure performed?: [x] No    [] Yes (see summary sheet for update)  Subjective functional status/changes:   [] No changes reported  The patient states that he was unable to perform much quad strengthening at home. OBJECTIVE  Modality rationale: decrease edema, decrease inflammation and decrease pain to improve the patients ability to improve ADL ease. Min Type Additional Details   10 [x]  Ice     []  heat  []  Ice massage  []  Laser   []  Anodyne Position: seated  Location: R knee   [] Skin assessment post-treatment:  []intact []redness- no adverse reaction    []redness  adverse reaction:     31 min Therapeutic Exercise:  [x] See flow sheet :   Rationale: increase ROM and increase strength to improve the patients ability to improve ADL ease . 20 min Neuromuscular Re-education:  X  See flow sheet :   Rationale: improve coordination, improve balance and increase proprioception  to improve the patients ability to improve ADL ease. With   [] TE   [] TA   [] neuro   [] other: Patient Education: [x] Review HEP    [] Progressed/Changed HEP based on:   [] positioning   [] body mechanics   [] transfers   [] heat/ice application    [] other:      Other Objective/Functional Measures:   Progressing with plyometric control during landing with improving deceleration. Continues to be limited with with jogging.      Pain Level (0-10 scale) post treatment: 1/10    ASSESSMENT/Changes in Function: The patient will continue to benefit from PT in order to progress quad strength and jogging ability. He continues to display quad fatigue and weakness as well as compensations, but improved over the past month. Patient will continue to benefit from skilled PT services to modify and progress therapeutic interventions, address functional mobility deficits, address ROM deficits, address strength deficits, analyze and address soft tissue restrictions, analyze and cue movement patterns, analyze and modify body mechanics/ergonomics, assess and modify postural abnormalities and instruct in home and community integration to attain remaining goals. []  See Plan of Care  []  See progress note/recertification  []  See Discharge Summary         Progress towards goals / Updated goals:  Updated Goals: to be achieved in 4 weeks:  1. The patient will demonstrate 6\" step downs void of compensation to maximize ambulation ease. Not met 9/7/2017; Progressed to 6\", hip drop noted, but improved 9/11/2017.  2. The patient will demonstrate 5 minutes of jogging on TM with no compensations and no more than 1 pain level elevation for progression of protocol. Not met 9/7/2017; Not met, progressed to 1:30 seconds x 2 sets. 3. The patient will improve FOTO score to 66 to maximize quality of life. Progressed to 60 9/11/2017. PLAN  []  Upgrade activities as tolerated     [x]  Continue plan of care  []  Update interventions per flow sheet       []  Discharge due to:_  []  Other:_      Francisco Evangelista, PT 9/11/2017  6:52 PM    No future appointments.

## 2017-09-21 ENCOUNTER — HOSPITAL ENCOUNTER (OUTPATIENT)
Dept: PHYSICAL THERAPY | Age: 27
Discharge: HOME | End: 2017-09-21
Payer: OTHER GOVERNMENT

## 2017-09-21 PROCEDURE — 97112 NEUROMUSCULAR REEDUCATION: CPT

## 2017-09-21 PROCEDURE — 97110 THERAPEUTIC EXERCISES: CPT

## 2017-09-21 PROCEDURE — 97530 THERAPEUTIC ACTIVITIES: CPT

## 2017-09-21 NOTE — PROGRESS NOTES
PT DAILY TREATMENT NOTE     Patient Name: Belgica Alvarez  Date:2017  : 1990  [x]  Patient  Verified  Payor:  / Plan: [employer] [facility] [address] AND DEPENDENTS / Product Type: Henrry Pradhan /    In time:6:00pm  Out time:6:56pm  Total Treatment Time (min): 64   1:1 time: 25  Visit #: 1 of 4    Treatment Area: Right knee pain [M25.561]    SUBJECTIVE  Pain Level (0-10 scale): 1  Any medication changes, allergies to medications, adverse drug reactions, diagnosis change, or new procedure performed?: [x] No    [] Yes (see summary sheet for update)  Subjective functional status/changes:   [] No changes reported  Pt reports attempting to run, but was limitedto ~200 feet d/t knee pain during \"impact\". Reports he has occasionally worked on some strengthening exercise. OBJECTIVE  27 min Therapeutic Exercise:  [x] See flow sheet :   Rationale: increase strength to improve the patients ability to improve ease of ADLs. 10 min Therapeutic Activity:  [x]  See flow sheet :   Rationale: increase strength, improve coordination and increase proprioception  to improve the patients ability to improve stability and eccentric control during plyometrics to facilitate return to running activity. 19 min Neuromuscular Re-education:  [x]  See flow sheet :   Rationale: increase strength, improve coordination, improve balance and increase proprioception  to improve the patients ability to improve knee stability during activity to facilitate return to running and rec sports.            With   [] TE   [] TA   [] neuro   [] other: Patient Education: [x] Review HEP    [] Progressed/Changed HEP based on:   [] positioning   [] body mechanics   [] transfers   [] heat/ice application    [] other:      Other Objective/Functional Measures:      Pain Level (0-10 scale) post treatment: 0    ASSESSMENT/Changes in Function: Limited triple extension during initial jumping limiting height and ability to eccentrically control landing, improved with addition of high wall jumps. Remains limited by RLE weakness. Educated pt regarding progressing strengthening interventions for HEP. Continue per POC. Patient will continue to benefit from skilled PT services to modify and progress therapeutic interventions, address functional mobility deficits, address strength deficits, analyze and cue movement patterns, analyze and modify body mechanics/ergonomics and instruct in home and community integration to attain remaining goals. []  See Plan of Care  []  See progress note/recertification  []  See Discharge Summary         Updated Goals: to be achieved in 4 weeks:  1. The patient will demonstrate 6\" step downs void of compensation to maximize ambulation ease. Not met 9/7/2017; Progressed to 6\", hip drop noted, but improved 9/11/2017.  2. The patient will demonstrate 5 minutes of jogging on TM with no compensations and no more than 1 pain level elevation for progression of protocol. Not met 9/7/2017; Not met, progressed to 1:30 seconds x 2 sets. Not met per pt report 9/21/2017  3. The patient will improve FOTO score to 66 to maximize quality of life.  Progressed to 60 9/11/2017.     PLAN  [x]  Upgrade activities as tolerated     [x]  Continue plan of care  []  Update interventions per flow sheet       []  Discharge due to:_  []  Other:_      Adam Meza, PT, DPT, ATC, CSCS 9/21/2017  7:15 PM    Future Appointments  Date Time Provider Dipesh Freeman   9/28/2017 6:00 PM [address] [place] [address]   10/5/2017 6:00 PM [address] [place] [address]   10/12/2017 6:00 PM Dong De La Torre, PT [facility]

## 2017-09-28 ENCOUNTER — APPOINTMENT (OUTPATIENT)
Dept: PHYSICAL THERAPY | Age: 27
End: 2017-09-28
Payer: OTHER GOVERNMENT

## 2017-10-05 ENCOUNTER — APPOINTMENT (OUTPATIENT)
Dept: PHYSICAL THERAPY | Age: 27
End: 2017-10-05

## 2017-10-12 ENCOUNTER — APPOINTMENT (OUTPATIENT)
Dept: PHYSICAL THERAPY | Age: 27
End: 2017-10-12

## 2017-10-19 ENCOUNTER — APPOINTMENT (OUTPATIENT)
Dept: PHYSICAL THERAPY | Age: 27
End: 2017-10-19

## 2018-01-17 NOTE — PROGRESS NOTES
[facility]  [address]  [facility], [address]  [phone] fax    Physical Therapy Discharge Summary  Patient name: Martha Harmon Start of Care: 2017   Referral source: Keyana Nayak MD : 1990   Medical/Treatment Diagnosis: Right knee pain [M25.561] Onset Date:2017   Prior Hospitalization: see medical history Provider#: [id number]   Medications: Verified on Patient Summary List      Comorbidities: R ACL reconstruction/meniscus reconstruction  Prior Level of Function: Inability to run due to pain and popping. Visits from Start of Care: 21    Missed Visits: 0  Reporting Period : 2017 to 2017    Summary of Care:  1. The patient will demonstrate 6\" step downs void of compensation to maximize ambulation ease. Not met 2017; Progressed to 6\", hip drop noted, but improved 2017.  2. The patient will demonstrate 5 minutes of jogging on TM with no compensations and no more than 1 pain level elevation for progression of protocol. Not met 2017; Not met, progressed to 1:30 seconds x 2 sets. Not met per pt report 2017  3. The patient will improve FOTO score to 66 to maximize quality of life.  Progressed to 60 2017. ASSESSMENT/RECOMMENDATIONS: The patient progressed well per above goals. Did not follow-up after 2017 appointment for further assessment of goals.     [x]Discontinue therapy:    Kelsey Ren, PT 2018 8:52 AM

## 2019-09-17 ENCOUNTER — OFFICE VISIT (OUTPATIENT)
Dept: FAMILY MEDICINE CLINIC | Age: 29
End: 2019-09-17

## 2019-09-17 VITALS
DIASTOLIC BLOOD PRESSURE: 74 MMHG | BODY MASS INDEX: 31.14 KG/M2 | HEART RATE: 67 BPM | TEMPERATURE: 98.1 F | HEIGHT: 73 IN | WEIGHT: 235 LBS | SYSTOLIC BLOOD PRESSURE: 110 MMHG | OXYGEN SATURATION: 97 % | RESPIRATION RATE: 16 BRPM

## 2019-09-17 DIAGNOSIS — Z00.00 WELL ADULT EXAM: Primary | ICD-10-CM

## 2019-09-17 DIAGNOSIS — Z13.220 SCREENING CHOLESTEROL LEVEL: ICD-10-CM

## 2019-09-17 DIAGNOSIS — E66.9 OBESITY, UNSPECIFIED CLASSIFICATION, UNSPECIFIED OBESITY TYPE, UNSPECIFIED WHETHER SERIOUS COMORBIDITY PRESENT: ICD-10-CM

## 2019-09-17 DIAGNOSIS — R79.89 ELEVATED LFTS: ICD-10-CM

## 2019-09-17 RX ORDER — SERTRALINE HYDROCHLORIDE 100 MG/1
175 TABLET, FILM COATED ORAL DAILY
COMMUNITY

## 2019-09-17 NOTE — PATIENT INSTRUCTIONS
Well Visit, Ages 25 to 48: Care Instructions  Your Care Instructions    Physical exams can help you stay healthy. Your doctor has checked your overall health and may have suggested ways to take good care of yourself. He or she also may have recommended tests. At home, you can help prevent illness with healthy eating, regular exercise, and other steps. Follow-up care is a key part of your treatment and safety. Be sure to make and go to all appointments, and call your doctor if you are having problems. It's also a good idea to know your test results and keep a list of the medicines you take. How can you care for yourself at home? · Reach and stay at a healthy weight. This will lower your risk for many problems, such as obesity, diabetes, heart disease, and high blood pressure. · Get at least 30 minutes of physical activity on most days of the week. Walking is a good choice. You also may want to do other activities, such as running, swimming, cycling, or playing tennis or team sports. Discuss any changes in your exercise program with your doctor. · Do not smoke or allow others to smoke around you. If you need help quitting, talk to your doctor about stop-smoking programs and medicines. These can increase your chances of quitting for good. · Talk to your doctor about whether you have any risk factors for sexually transmitted infections (STIs). Having one sex partner (who does not have STIs and does not have sex with anyone else) is a good way to avoid these infections. · Use birth control if you do not want to have children at this time. Talk with your doctor about the choices available and what might be best for you. · Protect your skin from too much sun. When you're outdoors from 10 a.m. to 4 p.m., stay in the shade or cover up with clothing and a hat with a wide brim. Wear sunglasses that block UV rays. Even when it's cloudy, put broad-spectrum sunscreen (SPF 30 or higher) on any exposed skin.   · See a dentist one or two times a year for checkups and to have your teeth cleaned. · Wear a seat belt in the car. Follow your doctor's advice about when to have certain tests. These tests can spot problems early. For everyone  · Cholesterol. Have the fat (cholesterol) in your blood tested after age 21. Your doctor will tell you how often to have this done based on your age, family history, or other things that can increase your risk for heart disease. · Blood pressure. Have your blood pressure checked during a routine doctor visit. Your doctor will tell you how often to check your blood pressure based on your age, your blood pressure results, and other factors. · Vision. Talk with your doctor about how often to have a glaucoma test.  · Diabetes. Ask your doctor whether you should have tests for diabetes. · Colon cancer. Your risk for colorectal cancer gets higher as you get older. Some experts say that adults should start regular screening at age 48 and stop at age 76. Others say to start before age 48 or continue after age 76. Talk with your doctor about your risk and when to start and stop screening. For women  · Breast exam and mammogram. Talk to your doctor about when you should have a clinical breast exam and a mammogram. Medical experts differ on whether and how often women under 50 should have these tests. Your doctor can help you decide what is right for you. · Cervical cancer screening test and pelvic exam. Begin with a Pap test at age 24. The test often is part of a pelvic exam. Starting at age 27, you may choose to have a Pap test, an HPV test, or both tests at the same time (called co-testing). Talk with your doctor about how often to have testing. · Tests for sexually transmitted infections (STIs). Ask whether you should have tests for STIs. You may be at risk if you have sex with more than one person, especially if your partners do not wear condoms.   For men  · Tests for sexually transmitted infections (STIs). Ask whether you should have tests for STIs. You may be at risk if you have sex with more than one person, especially if you do not wear a condom. · Testicular cancer exam. Ask your doctor whether you should check your testicles regularly. · Prostate exam. Talk to your doctor about whether you should have a blood test (called a PSA test) for prostate cancer. Experts differ on whether and when men should have this test. Some experts suggest it if you are older than 39 and are -American or have a father or brother who got prostate cancer when he was younger than 72. When should you call for help? Watch closely for changes in your health, and be sure to contact your doctor if you have any problems or symptoms that concern you. Where can you learn more? Go to http://taina-kavon.info/. Enter P072 in the search box to learn more about \"Well Visit, Ages 25 to 48: Care Instructions. \"  Current as of: December 13, 2018  Content Version: 12.1  © 7884-2487 [employer]. Care instructions adapted under license by [employer] (which disclaims liability or warranty for this information). If you have questions about a medical condition or this instruction, always ask your healthcare professional. [place] [address] any warranty or liability for your use of this information. Testicular Self-Exam: Care Instructions  Your Care Instructions    A self-exam is a way for you to check for cancer of the testicles. Although testicular cancer is rare, it is one of the most common tumors in men younger than 28. Many testicular cancers are found during self-exam. In the early stages of testicular cancer, the lump, which may be about the size of a pea, usually is not painful. Testicular cancer, especially if treated early, is very often cured. By doing this self-exam regularly, you can learn the normal size, shape, and weight of your testicles. This allows you to note any changes. Follow-up care is a key part of your treatment and safety. Be sure to make and go to all appointments, and call your doctor if you are having problems. It's also a good idea to know your test results and keep a list of the medicines you take. How can you care for yourself at home? · The exam is best done during or after a bath or shower--when the scrotum, the skin sac that holds the testicles, is relaxed. · Stand and place your right leg on a raised surface about chair height. Then gently feel your scrotum until you find the right testicle. · Roll the testicle gently but firmly between your thumb and fingers of both hands. Carefully feel the surface for lumps. Feel for any change in the size, shape, or texture of the testicle. The testicle should feel round and smooth. It is normal for one testicle to be slightly larger than the other one. · Repeat this for the left side. Feel the entire surface of both testicles. · You may feel the epididymis, the soft tube behind each testicle. Become familiar with this structure so that you won't mistake it for a lump. When should you call for help? Call your doctor now or seek immediate medical care if:    · You have pain in a testicle.    Watch closely for changes in your health, and be sure to contact your doctor if:    · You notice a change in a testicle.     · You notice a lump in a testicle. Where can you learn more? Go to http://taina-kavon.info/. Enter I924 in the search box to learn more about \"Testicular Self-Exam: Care Instructions. \"  Current as of: September 26, 2018  Content Version: 12.1  © 3219-7089 [employer]. Care instructions adapted under license by [employer] (which disclaims liability or warranty for this information).  If you have questions about a medical condition or this instruction, always ask your healthcare professional. Maddy Heredia any warranty or liability for your use of this information.

## 2019-09-17 NOTE — PROGRESS NOTES
Chief Complaint   Patient presents with    New Patient    Physical     Subjective:   Casey Mckee is a 34 y.o. male presenting for his annual checkup. ROS:  Complete 10 system ROS is obtained from the patient intake forms which are reviewed with the patient. The intake H&P is scanned in for the chart. Specific concerns today:  Prior PCP saw 2 years ago. Had a work-up for elevated LFTs. Does not recall any imaging. Could not find a cause. Sees a psychiatrist for anxiety which is controlled on Zoloft. Exercises a few days per week at [facility]. Has had gradual weight gain over the years. Former  honorably  from [employer] for school. Current Outpatient Medications   Medication Sig Dispense Refill    sertraline (ZOLOFT) 100 mg tablet Take 175 mg by mouth daily.  MULTIVITAMIN PO Take 1 Tab by mouth daily. No Known Allergies  Past Medical History:   Diagnosis Date    Anxiety     sees Dr. Araseli Alexander (pyschiatrist)     Past Surgical History:   Procedure Laterality Date    HX ACL RECONSTRUCTION Right 05/2017    Dr. Bethany Harris     Family History   Problem Relation Age of Onset    Alcohol abuse Father     Stroke Maternal Grandmother     Schizophrenia Maternal Grandfather     Depression Paternal Grandmother      Social History     Tobacco Use    Smoking status: Former Smoker     Packs/day: 0.33     Years: 4.00     Pack years: 1.32     Types: Cigarettes    Smokeless tobacco: Never Used   Substance Use Topics    Alcohol use: Yes     Frequency: 2-4 times a month     Drinks per session: 1 or 2     Binge frequency: Never        Objective:     Visit Vitals  /74 (BP 1 Location: Left arm, BP Patient Position: Sitting)   Pulse 67   Temp 98.1 °F (36.7 °C) (Oral)   Resp 16   Ht 6' 1\" (1.854 m)   Wt 235 lb (106.6 kg)   SpO2 97%   BMI 31.00 kg/m²     The patient appears well, alert, oriented x 3, in no distress. ENT normal.  Neck supple. No adenopathy or thyromegaly. JULIANA.  Lungs are clear, good air entry, no wheezes, rhonchi or rales. S1 and S2 normal, no murmurs, regular rate and rhythm. Abdomen is soft without tenderness, guarding, mass or organomegaly.  exam: he has no concerns, counseled on self testicular exams. Extremities show no edema, normal peripheral pulses. Neurological is normal without focal findings. Psych: normal affect. Mood good. Oriented x 3. Judgement and insight intact. Assessment/Plan:       ICD-10-CM ICD-9-CM    1. Well adult exam O44.07 H72.9 METABOLIC PANEL, COMPREHENSIVE      CBC W/O DIFF      LIPID PANEL   2. Elevated LFTs T07.7 516.4 METABOLIC PANEL, COMPREHENSIVE   3. Screening cholesterol level Z13.220 V77.91 LIPID PANEL   4. Obesity, unspecified classification, unspecified obesity type, unspecified whether serious comorbidity present E66.9 278.00      Age and sex specific counseling. Flu vaccine at work. Advised to get us vaccine records. Advised for him to get Dr. Lam Erps records of his liver work-up for our possible review. Fasting labs ordered. Diet and exercise for obesity. Cont per psychiatry for anxiety. All chart history elements were reviewed by me at the time of the visit even though marked at time of note closure. Patient understands our medical plan. Patient has provided input and agrees with goals. Alternatives have been explained and offered. All questions answered. The patient is to call if condition worsens or fails to improve. Follow-up and Dispositions    · Return in about 1 year (around 9/17/2020) for physical. Fasting labs due at your earliest convenience .

## 2019-09-17 NOTE — PROGRESS NOTES
1. Have you been to the ER, urgent care clinic since your last visit? Hospitalized since your last visit? No    2. Have you seen or consulted any other health care providers outside of the [address] [place] [address] since your last visit? Include any pap smears or colon screening.  No

## 2020-01-25 ENCOUNTER — HOSPITAL ENCOUNTER (OUTPATIENT)
Dept: LAB | Age: 30
Discharge: HOME | End: 2020-01-25
Payer: OTHER GOVERNMENT

## 2020-01-25 DIAGNOSIS — Z13.220 SCREENING CHOLESTEROL LEVEL: ICD-10-CM

## 2020-01-25 DIAGNOSIS — R79.89 ELEVATED LFTS: ICD-10-CM

## 2020-01-25 DIAGNOSIS — Z00.00 WELL ADULT EXAM: ICD-10-CM

## 2020-01-25 LAB
ALBUMIN SERPL-MCNC: 3.9 G/DL (ref 3.4–5)
ALBUMIN/GLOB SERPL: 1.4 {RATIO} (ref 0.8–1.7)
ALP SERPL-CCNC: 67 U/L (ref 45–117)
ALT SERPL-CCNC: 35 U/L (ref 16–61)
ANION GAP SERPL CALC-SCNC: 4 MMOL/L (ref 3–18)
AST SERPL-CCNC: 31 U/L (ref 10–38)
BILIRUB SERPL-MCNC: 0.6 MG/DL (ref 0.2–1)
BUN SERPL-MCNC: 16 MG/DL (ref 7–18)
BUN/CREAT SERPL: 17 (ref 12–20)
CALCIUM SERPL-MCNC: 9.2 MG/DL (ref 8.5–10.1)
CHLORIDE SERPL-SCNC: 110 MMOL/L (ref 100–111)
CHOLEST SERPL-MCNC: 193 MG/DL
CO2 SERPL-SCNC: 28 MMOL/L (ref 21–32)
CREAT SERPL-MCNC: 0.93 MG/DL (ref 0.6–1.3)
ERYTHROCYTE [DISTWIDTH] IN BLOOD BY AUTOMATED COUNT: 12.3 % (ref 11.6–14.5)
GLOBULIN SER CALC-MCNC: 2.8 G/DL (ref 2–4)
GLUCOSE SERPL-MCNC: 89 MG/DL (ref 74–99)
HCT VFR BLD AUTO: 43 % (ref 36–48)
HDLC SERPL-MCNC: 63 MG/DL (ref 40–60)
HDLC SERPL: 3.1 {RATIO} (ref 0–5)
HGB BLD-MCNC: 15.2 G/DL (ref 13–16)
LDLC SERPL CALC-MCNC: 114 MG/DL (ref 0–100)
LIPID PROFILE,FLP: ABNORMAL
MCH RBC QN AUTO: 31.6 PG (ref 24–34)
MCHC RBC AUTO-ENTMCNC: 35.3 G/DL (ref 31–37)
MCV RBC AUTO: 89.4 FL (ref 74–97)
PLATELET # BLD AUTO: 167 K/UL (ref 135–420)
PMV BLD AUTO: 12.8 FL (ref 9.2–11.8)
POTASSIUM SERPL-SCNC: 4.4 MMOL/L (ref 3.5–5.5)
PROT SERPL-MCNC: 6.7 G/DL (ref 6.4–8.2)
RBC # BLD AUTO: 4.81 M/UL (ref 4.7–5.5)
SODIUM SERPL-SCNC: 142 MMOL/L (ref 136–145)
TRIGL SERPL-MCNC: 80 MG/DL (ref ?–150)
VLDLC SERPL CALC-MCNC: 16 MG/DL
WBC # BLD AUTO: 4.9 K/UL (ref 4.6–13.2)

## 2020-01-25 PROCEDURE — 80053 COMPREHEN METABOLIC PANEL: CPT

## 2020-01-25 PROCEDURE — 80061 LIPID PANEL: CPT

## 2020-01-25 PROCEDURE — 85027 COMPLETE CBC AUTOMATED: CPT

## 2020-01-25 PROCEDURE — 36415 COLL VENOUS BLD VENIPUNCTURE: CPT

## 2020-01-27 NOTE — PROGRESS NOTES
Geo Sims [phone] advising patient of normal labs. He was asked to contact \[facility]\"" if he has any questions.

## 2020-02-04 ENCOUNTER — OFFICE VISIT (OUTPATIENT)
Dept: FAMILY MEDICINE CLINIC | Age: 30
End: 2020-02-04

## 2020-02-04 VITALS
WEIGHT: 242 LBS | DIASTOLIC BLOOD PRESSURE: 84 MMHG | TEMPERATURE: 98.4 F | RESPIRATION RATE: 16 BRPM | SYSTOLIC BLOOD PRESSURE: 116 MMHG | HEIGHT: 73 IN | OXYGEN SATURATION: 97 % | HEART RATE: 85 BPM | BODY MASS INDEX: 32.07 KG/M2

## 2020-02-04 DIAGNOSIS — F41.9 ANXIETY: ICD-10-CM

## 2020-02-04 DIAGNOSIS — E66.9 OBESITY, UNSPECIFIED CLASSIFICATION, UNSPECIFIED OBESITY TYPE, UNSPECIFIED WHETHER SERIOUS COMORBIDITY PRESENT: ICD-10-CM

## 2020-02-04 DIAGNOSIS — R79.89 ELEVATED LFTS: Primary | ICD-10-CM

## 2020-02-04 NOTE — LETTER
2/4/2020 3:09 PM 
 
Mr. Carroll Molina [address] [address] [place] [id number] To Whom It May Concern, 
 
Due to Sorin's past ACL and meniscus injury to his right knee, he will have an increased likelihood of developing arthritis in that knee. His best prevention at this time is activity modification to limit high impact or physically demanding weight bearing activities. Sincerely, Allan Cassidy MD

## 2020-02-04 NOTE — PATIENT INSTRUCTIONS
A Healthy Lifestyle: Care Instructions  Your Care Instructions    A healthy lifestyle can help you feel good, stay at a healthy weight, and have plenty of energy for both work and play. A healthy lifestyle is something you can share with your whole family. A healthy lifestyle also can lower your risk for serious health problems, such as high blood pressure, heart disease, and diabetes. You can follow a few steps listed below to improve your health and the health of your family. Follow-up care is a key part of your treatment and safety. Be sure to make and go to all appointments, and call your doctor if you are having problems. It's also a good idea to know your test results and keep a list of the medicines you take. How can you care for yourself at home? · Do not eat too much sugar, fat, or fast foods. You can still have dessert and treats now and then. The goal is moderation. · Start small to improve your eating habits. Pay attention to portion sizes, drink less juice and soda pop, and eat more fruits and vegetables. ? Eat a healthy amount of food. A 3-ounce serving of meat, for example, is about the size of a deck of cards. Fill the rest of your plate with vegetables and whole grains. ? Limit the amount of soda and sports drinks you have every day. Drink more water when you are thirsty. ? Eat at least 5 servings of fruits and vegetables every day. It may seem like a lot, but it is not hard to reach this goal. A serving or helping is 1 piece of fruit, 1 cup of vegetables, or 2 cups of leafy, raw vegetables. Have an apple or some carrot sticks as an afternoon snack instead of a candy bar. Try to have fruits and/or vegetables at every meal.  · Make exercise part of your daily routine. You may want to start with simple activities, such as walking, bicycling, or slow swimming. Try to be active 30 to 60 minutes every day. You do not need to do all 30 to 60 minutes all at once.  For example, you can exercise 3 times a day for 10 or 20 minutes. Moderate exercise is safe for most people, but it is always a good idea to talk to your doctor before starting an exercise program.  · Keep moving. [place] the lawn, work in the garden, or [employer]. Take the stairs instead of the elevator at work. · If you smoke, quit. People who smoke have an increased risk for heart attack, stroke, cancer, and other lung illnesses. Quitting is hard, but there are ways to boost your chance of quitting tobacco for good. ? Use nicotine gum, patches, or lozenges. ? Ask your doctor about stop-smoking programs and medicines. ? Keep trying. In addition to reducing your risk of diseases in the future, you will notice some benefits soon after you stop using tobacco. If you have shortness of breath or asthma symptoms, they will likely get better within a few weeks after you quit. · Limit how much alcohol you drink. Moderate amounts of alcohol (up to 2 drinks a day for men, 1 drink a day for women) are okay. But drinking too much can lead to liver problems, high blood pressure, and other health problems. Family health  If you have a family, there are many things you can do together to improve your health. · Eat meals together as a family as often as possible. · Eat healthy foods. This includes fruits, vegetables, lean meats and dairy, and whole grains. · Include your family in your fitness plan. Most people think of activities such as jogging or tennis as the way to fitness, but there are many ways you and your family can be more active. Anything that makes you breathe hard and gets your heart pumping is exercise. Here are some tips:  ? Walk to do errands or to take your child to school or the bus.  ? Go for a family bike ride after dinner instead of watching TV. Where can you learn more? Go to http://taina-kavon.info/. Enter H265 in the search box to learn more about \"A Healthy Lifestyle: Care Instructions. \"  Current as of: May 28, 2019  Content Version: 12.2  © 8284-5072 [employer]. Care instructions adapted under license by [employer] (which disclaims liability or warranty for this information). If you have questions about a medical condition or this instruction, always ask your healthcare professional. [address] any warranty or liability for your use of this information.

## 2020-02-04 NOTE — PROGRESS NOTES
SUBJECTIVE  Chief Complaint   Patient presents with    Results    Knee Pain     R>L; h/o surgery      Patient presents for general follow-up and to review labs. Was not able to get us records from prior liver work-up but we can see his liver U/S in Care Everywhere. He has no complaints and is back on a good diet and exercise regimen. Anxiety is adequately controlled. 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 He had ACL reconstruction with Dr. Laxmi Razo in 2017. He does have some intermittent knee pains. None at this time. OBJECTIVE    Blood pressure 116/84, pulse 85, temperature 98.4 °F (36.9 °C), temperature source Oral, resp. rate 16, height 6' 1\" (1.854 m), weight 242 lb (109.8 kg), SpO2 97 %. General:  Alert, cooperative, well appearing, in no apparent distress. ASSESSMENT / PLAN    ICD-10-CM ICD-9-CM    1. Elevated LFTs R94.5 790.6    2. Obesity, unspecified classification, unspecified obesity type, unspecified whether serious comorbidity present E66.9 278.00    3. Anxiety F41.9 300.00      Elevated LFTs - testing normalized. U/S of liver reviewed from [place] records. Obesity- cont diet and exercise. Anxiety-  Cont per psychiatrist.    Becka Iverson written for his knee - see letter tab. He will see Dr. Laxmi Razo for his knees. 15 minutes of face to face time spent with the patient with at least 50% on counseling on above medical issues. All chart history elements were reviewed by me at the time of the visit even though marked at time of note closure. Patient understands our medical plan. Patient has provided input and agrees with goals. Alternatives have been explained and offered. All questions answered. The patient is to call if condition worsens or fails to improve.      Follow-up and Dispositions    · Return in about 1 year (around 2/4/2021) for physical.

## 2024-11-02 NOTE — PROGRESS NOTES
1. Have you been to the ER, urgent care clinic since your last visit? Hospitalized since your last visit? No    2. Have you seen or consulted any other health care providers outside of the [address] [place] [address] since your last visit? Include any pap smears or colon screening.  No No indicators present